# Patient Record
Sex: FEMALE | Race: WHITE | NOT HISPANIC OR LATINO | Employment: FULL TIME | URBAN - METROPOLITAN AREA
[De-identification: names, ages, dates, MRNs, and addresses within clinical notes are randomized per-mention and may not be internally consistent; named-entity substitution may affect disease eponyms.]

---

## 2018-03-21 ENCOUNTER — RECORDS - HEALTHEAST (OUTPATIENT)
Dept: LAB | Facility: CLINIC | Age: 58
End: 2018-03-21

## 2018-03-23 LAB — BACTERIA SPEC CULT: NO GROWTH

## 2018-11-14 ENCOUNTER — RECORDS - HEALTHEAST (OUTPATIENT)
Dept: LAB | Facility: CLINIC | Age: 58
End: 2018-11-14

## 2018-11-15 LAB
ALBUMIN SERPL-MCNC: 4 G/DL (ref 3.5–5)
ALP SERPL-CCNC: 115 U/L (ref 45–120)
ALT SERPL W P-5'-P-CCNC: 23 U/L (ref 0–45)
ANION GAP SERPL CALCULATED.3IONS-SCNC: 11 MMOL/L (ref 5–18)
AST SERPL W P-5'-P-CCNC: 26 U/L (ref 0–40)
BILIRUB SERPL-MCNC: 0.4 MG/DL (ref 0–1)
BUN SERPL-MCNC: 17 MG/DL (ref 8–22)
CALCIUM SERPL-MCNC: 9.6 MG/DL (ref 8.5–10.5)
CHLORIDE BLD-SCNC: 104 MMOL/L (ref 98–107)
CHOLEST SERPL-MCNC: 241 MG/DL
CK SERPL-CCNC: 427 U/L (ref 30–190)
CO2 SERPL-SCNC: 26 MMOL/L (ref 22–31)
CREAT SERPL-MCNC: 0.83 MG/DL (ref 0.6–1.1)
FASTING STATUS PATIENT QL REPORTED: NO
GFR SERPL CREATININE-BSD FRML MDRD: >60 ML/MIN/1.73M2
GLUCOSE BLD-MCNC: 83 MG/DL (ref 70–125)
HDLC SERPL-MCNC: 56 MG/DL
LDLC SERPL CALC-MCNC: 156 MG/DL
POTASSIUM BLD-SCNC: 3.9 MMOL/L (ref 3.5–5)
PROT SERPL-MCNC: 7.5 G/DL (ref 6–8)
SODIUM SERPL-SCNC: 141 MMOL/L (ref 136–145)
TRIGL SERPL-MCNC: 146 MG/DL
TSH SERPL DL<=0.005 MIU/L-ACNC: 2.03 UIU/ML (ref 0.3–5)

## 2018-11-16 LAB — 25(OH)D3 SERPL-MCNC: 47.3 NG/ML (ref 30–80)

## 2019-02-05 ENCOUNTER — RECORDS - HEALTHEAST (OUTPATIENT)
Dept: LAB | Facility: CLINIC | Age: 59
End: 2019-02-05

## 2019-02-06 LAB — TSH SERPL DL<=0.005 MIU/L-ACNC: 3.13 UIU/ML (ref 0.3–5)

## 2019-05-29 ENCOUNTER — RECORDS - HEALTHEAST (OUTPATIENT)
Dept: LAB | Facility: CLINIC | Age: 59
End: 2019-05-29

## 2019-05-29 LAB
ALBUMIN SERPL-MCNC: 3.6 G/DL (ref 3.5–5)
ALP SERPL-CCNC: 90 U/L (ref 45–120)
ALT SERPL W P-5'-P-CCNC: 22 U/L (ref 0–45)
ANION GAP SERPL CALCULATED.3IONS-SCNC: 10 MMOL/L (ref 5–18)
AST SERPL W P-5'-P-CCNC: 24 U/L (ref 0–40)
BILIRUB SERPL-MCNC: 0.2 MG/DL (ref 0–1)
BUN SERPL-MCNC: 20 MG/DL (ref 8–22)
CALCIUM SERPL-MCNC: 9.7 MG/DL (ref 8.5–10.5)
CHLORIDE BLD-SCNC: 107 MMOL/L (ref 98–107)
CHOLEST SERPL-MCNC: 236 MG/DL
CK SERPL-CCNC: 355 U/L (ref 30–190)
CO2 SERPL-SCNC: 24 MMOL/L (ref 22–31)
CREAT SERPL-MCNC: 0.82 MG/DL (ref 0.6–1.1)
FASTING STATUS PATIENT QL REPORTED: YES
GFR SERPL CREATININE-BSD FRML MDRD: >60 ML/MIN/1.73M2
GLUCOSE BLD-MCNC: 90 MG/DL (ref 70–125)
HDLC SERPL-MCNC: 51 MG/DL
LDLC SERPL CALC-MCNC: 152 MG/DL
POTASSIUM BLD-SCNC: 4.8 MMOL/L (ref 3.5–5)
PROT SERPL-MCNC: 6.7 G/DL (ref 6–8)
SODIUM SERPL-SCNC: 141 MMOL/L (ref 136–145)
TRIGL SERPL-MCNC: 167 MG/DL
TSH SERPL DL<=0.005 MIU/L-ACNC: 2.09 UIU/ML (ref 0.3–5)

## 2019-05-30 LAB — 25(OH)D3 SERPL-MCNC: 46.3 NG/ML (ref 30–80)

## 2019-07-17 ENCOUNTER — RECORDS - HEALTHEAST (OUTPATIENT)
Dept: ADMINISTRATIVE | Facility: OTHER | Age: 59
End: 2019-07-17

## 2019-11-07 ENCOUNTER — HOSPITAL ENCOUNTER (OUTPATIENT)
Dept: MAMMOGRAPHY | Facility: CLINIC | Age: 59
Discharge: HOME | End: 2019-11-07
Attending: FAMILY MEDICINE

## 2019-11-07 DIAGNOSIS — Z12.31 VISIT FOR SCREENING MAMMOGRAM: ICD-10-CM

## 2019-11-08 ENCOUNTER — RECORDS - HEALTHEAST (OUTPATIENT)
Dept: ADMINISTRATIVE | Facility: OTHER | Age: 59
End: 2019-11-08

## 2019-11-12 ENCOUNTER — RECORDS - HEALTHEAST (OUTPATIENT)
Dept: LAB | Facility: CLINIC | Age: 59
End: 2019-11-12

## 2019-11-12 LAB
ALBUMIN SERPL-MCNC: 4.1 G/DL (ref 3.5–5)
ALP SERPL-CCNC: 104 U/L (ref 45–120)
ALT SERPL W P-5'-P-CCNC: 33 U/L (ref 0–45)
ANION GAP SERPL CALCULATED.3IONS-SCNC: 11 MMOL/L (ref 5–18)
AST SERPL W P-5'-P-CCNC: 31 U/L (ref 0–40)
BILIRUB SERPL-MCNC: 0.2 MG/DL (ref 0–1)
BUN SERPL-MCNC: 17 MG/DL (ref 8–22)
CALCIUM SERPL-MCNC: 9.6 MG/DL (ref 8.5–10.5)
CHLORIDE BLD-SCNC: 105 MMOL/L (ref 98–107)
CHOLEST SERPL-MCNC: 234 MG/DL
CK SERPL-CCNC: 465 U/L (ref 30–190)
CO2 SERPL-SCNC: 25 MMOL/L (ref 22–31)
CREAT SERPL-MCNC: 0.82 MG/DL (ref 0.6–1.1)
FASTING STATUS PATIENT QL REPORTED: YES
GFR SERPL CREATININE-BSD FRML MDRD: >60 ML/MIN/1.73M2
GLUCOSE BLD-MCNC: 82 MG/DL (ref 70–125)
HDLC SERPL-MCNC: 55 MG/DL
LDLC SERPL CALC-MCNC: 145 MG/DL
POTASSIUM BLD-SCNC: 4.7 MMOL/L (ref 3.5–5)
PROT SERPL-MCNC: 7.4 G/DL (ref 6–8)
SODIUM SERPL-SCNC: 141 MMOL/L (ref 136–145)
TRIGL SERPL-MCNC: 170 MG/DL

## 2019-11-13 LAB — 25(OH)D3 SERPL-MCNC: 41.7 NG/ML (ref 30–80)

## 2019-11-15 ENCOUNTER — HOSPITAL ENCOUNTER (OUTPATIENT)
Dept: MAMMOGRAPHY | Facility: CLINIC | Age: 59
Discharge: HOME | End: 2019-11-15
Attending: FAMILY MEDICINE

## 2019-11-15 DIAGNOSIS — N64.89 BREAST ASYMMETRY: ICD-10-CM

## 2020-04-21 ENCOUNTER — RECORDS - HEALTHEAST (OUTPATIENT)
Dept: LAB | Facility: CLINIC | Age: 60
End: 2020-04-21

## 2020-04-21 LAB
ANION GAP SERPL CALCULATED.3IONS-SCNC: 8 MMOL/L (ref 5–18)
BUN SERPL-MCNC: 18 MG/DL (ref 8–22)
CALCIUM SERPL-MCNC: 9.6 MG/DL (ref 8.5–10.5)
CHLORIDE BLD-SCNC: 106 MMOL/L (ref 98–107)
CK SERPL-CCNC: 244 U/L (ref 30–190)
CO2 SERPL-SCNC: 28 MMOL/L (ref 22–31)
CREAT SERPL-MCNC: 0.79 MG/DL (ref 0.6–1.1)
GFR SERPL CREATININE-BSD FRML MDRD: >60 ML/MIN/1.73M2
GLUCOSE BLD-MCNC: 99 MG/DL (ref 70–125)
POTASSIUM BLD-SCNC: 4.8 MMOL/L (ref 3.5–5)
SODIUM SERPL-SCNC: 142 MMOL/L (ref 136–145)

## 2020-04-27 ENCOUNTER — ALLIED HEALTH/NURSE VISIT (OUTPATIENT)
Dept: PHARMACY | Facility: PHYSICIAN GROUP | Age: 60
End: 2020-04-27
Payer: COMMERCIAL

## 2020-04-27 DIAGNOSIS — S86.899S MEDIAL TIBIAL STRESS SYNDROME, UNSPECIFIED LATERALITY, SEQUELA: ICD-10-CM

## 2020-04-27 DIAGNOSIS — M76.61 ACHILLES TENDINITIS OF BOTH LOWER EXTREMITIES: ICD-10-CM

## 2020-04-27 DIAGNOSIS — R60.9 EDEMA, UNSPECIFIED TYPE: ICD-10-CM

## 2020-04-27 DIAGNOSIS — I10 BENIGN ESSENTIAL HYPERTENSION: Primary | ICD-10-CM

## 2020-04-27 DIAGNOSIS — K50.919 CROHN'S DISEASE WITH COMPLICATION, UNSPECIFIED GASTROINTESTINAL TRACT LOCATION (H): ICD-10-CM

## 2020-04-27 DIAGNOSIS — G47.00 INSOMNIA, UNSPECIFIED TYPE: ICD-10-CM

## 2020-04-27 DIAGNOSIS — M76.62 ACHILLES TENDINITIS OF BOTH LOWER EXTREMITIES: ICD-10-CM

## 2020-04-27 DIAGNOSIS — Z78.9 TAKES DIETARY SUPPLEMENTS: ICD-10-CM

## 2020-04-27 DIAGNOSIS — R25.2 NOCTURNAL MUSCLE CRAMP: ICD-10-CM

## 2020-04-27 DIAGNOSIS — G25.81 RESTLESS LEGS SYNDROME (RLS): ICD-10-CM

## 2020-04-27 PROCEDURE — 99607 MTMS BY PHARM ADDL 15 MIN: CPT | Performed by: PHARMACIST

## 2020-04-27 PROCEDURE — 99605 MTMS BY PHARM NP 15 MIN: CPT | Performed by: PHARMACIST

## 2020-04-27 RX ORDER — CYCLOBENZAPRINE HCL 10 MG
10 TABLET ORAL 3 TIMES DAILY PRN
COMMUNITY

## 2020-04-27 RX ORDER — PRAMIPEXOLE DIHYDROCHLORIDE 0.5 MG/1
1 TABLET ORAL 2 TIMES DAILY
COMMUNITY

## 2020-04-27 RX ORDER — VALSARTAN 40 MG/1
40 TABLET ORAL 2 TIMES DAILY
COMMUNITY
End: 2024-08-01 | Stop reason: DRUGHIGH

## 2020-04-27 RX ORDER — FLUORIDE TOOTHPASTE
TOOTHPASTE DENTAL 4 TIMES DAILY
COMMUNITY
End: 2024-08-01

## 2020-04-27 RX ORDER — SALSALATE 500 MG/1
1000 TABLET, FILM COATED ORAL 2 TIMES DAILY
COMMUNITY

## 2020-04-27 RX ORDER — TRAMADOL HYDROCHLORIDE 50 MG/1
50 TABLET ORAL EVERY 6 HOURS PRN
COMMUNITY
End: 2024-08-01

## 2020-04-27 RX ORDER — LOPERAMIDE HYDROCHLORIDE 2 MG/1
2 TABLET ORAL 4 TIMES DAILY PRN
COMMUNITY

## 2020-04-27 NOTE — PROGRESS NOTES
[facility] ENCOUNTER  SUBJECTIVE/OBJECTIVE:                           Chante Tracy is a 59 year old female called for an initial visit. She was referred to me from Dr. Ho.      Patient consented to a telehealth visit: yes  Telemedicine Visit Details  Type of service:  Telephone visit  Start Time: 9:30  End Time: 10:41  Originating Location (pt. Location): Home  Distant Location (provider location):  [facility]  Mode of Communication:  Telephone    Chief Complaint: Medication review, to discuss dosing of valsartan.  Patient is having difficulty with dosing valsartan.  At higher doses she felt her blood pressure was significantly lower and could not tolerate.  However when the dose was decreased now her blood pressure is running too high.    Allergies/ADRs:  icy hot patches - rashes, Dyazide - hives, Zocor - hives, Motrin - upset stomach, Valium - heart races, amoxicillin - hives, aspirin - nose bleeds, hydrochlorothiazide - nose bleeds, indapamide - hives, lisinopril - hives, albuterol, Celebrex - hives, vomiting   Tobacco: Never smoker  Alcohol: not currently using  Caffeine: 4 cups/day of coffee  Activity: Patient is on her feet all day at work.  She would really like to start walking more, and being more active so she can start losing weight  PMH: Reviewed in [facility]W    Medication Adherence/Access:   Now with the midday dose of valsartan patient is having a difficult time remembering the 20 mg dose some days.  Otherwise typically is not forgetting her medications.    Hypertension/Edema:   Current medications include Valsartan 40 mg AM/ 20 mg PM/ 40 mg HS. Patient does self-monitor BP. Home BP monitoring in range of 130-140's systolic PM, /91.  Today her BP was 126/86.  Doesn't recall diastolic BP.   Patient reports no current medication side effects.  Pt has stopped adding salt to food, reports she would like to lose weight.  She is trying to go on walks often to help with  weight loss.  She was thinking of buying a hula hoop, she used to do that when she was younger.   Pt reports some edema in ankles.  Elevates her legs at night, and has tried compression socks.  Is on her feet a lot at work, is a  at [employer].    Patient wanted to try splitting the dose to see if splitting the dose would help her absorption.  So far the PM dose she is having some difficulty with remembering the afternoon one.  Pt had been taken 160 mg at once and felt very dizzy with it.  She felt very dizzy when her BP was 112 systolic.  She reports syncopal episode last year.  Her BP 88/40 during this episode, When taking 160 mg dose.       Pt reports she has slowed emptying of her stomach, and that she will notices the stomach emptying at once and all the meds will hit her.  She reports will not have good digestion of her foods/meds.   Says the pills sit there for a long time, and then will get a weeks worth of medications all absorbed at once.    Insomnia:   Current medications include: amitriptyline 10 mg at bedtime. Pt reports trouble falling asleep, just can't stop thinking.  Denies grogginess, constipation.  Does have dry mouth, but uses biotine mouth wash PRN.    Muscle Cramps/RLS:  Currently taking Cyclobenzaprine 20 mg in the morning and 20 mg at bedtime, and Pramipexole 0.5 mg at bedtime.  She does find cyclobenzaprine helpful for preventing muscle cramps.  Denies sedation with this.  She pramipexole works very well, is able to sleep well within an hour of taking it.    Pt gets adalberto horses almost twice per week, usually at night.  Has been going on since January.      Shin splints/Achilles tendonitits:   Patient has tramadol 50 mg once daily PRN, but rarely uses it.  She prefers to use as little as possible.  She may also sometimes use Excedrine migraine for the tendon/chrons pain.    Chrons:   Patient currently taking Salsalate 1000 mg twice daily, and Loperamide 2-4 mg PRN.  She reports this is  very effective.  She is no longer having gas build up, stomach discomfort, and pain has improved a lot.  Still has flare ups of her Chrons, but usually mild to moderate.  She had 3 attacks so far this year.    Patient reports that she also has lazy gut syndrome, and this is part of what affects her stomach absorption of the valsartan medication.  Per patient she has also been tested for slowed gastric emptying and it was slightly slow.      Supplements/OTCs:   Patient is currently taking vitamin D 5000 units once daily.  Denies issues with this.      4/21/2020:   Vitals: Ht 64, Wt 220 lb 4 oz, BMI 37.80, Pulse 95, /80, Doctor:138/78, Arm / Cuff size Regular:lt, Initials tc.       Today's Vitals: There were no vitals taken for this visit. -Telephone encounter      ASSESSMENT:                              Medication Adherence: good, no issues identified      Hypertension/Edema:   Needs Improvement. Patient is not meeting BP goal of < 130/80mmHg.  Patient could try crushing up the valsartan tablets so that they will more easily absorbed when she takes them.   It is not likely that the valsartan tablets are sitting in her stomach for days at a time and then being absorbed.  It is possible though that with her lazy gut syndrome and Crohn's she may have altered absorption of these medications and it could be inconsistent.  This could possibly explain how she feels that the medication will hit her all at once or feels more dizziness from it at times.  Crushing it will likely help with this issue so that it will absorb more easily once in the stomach.    If this does not help we could look into possible compounded oral medications for blood pressure or topicals such as low-dose of a clonidine patch.  It would be preferred to keep her on a ARB however.    Insomnia:   Stable.    Muscle Cramps/RLS:  Needs improvement.  We discussed the importance of doing nightly stretches for her calves and thighs to help prevent the  muscle cramps from occurring.  It might also be beneficial to consider a trial on a supplements such as magnesium 200 to 400 mg once nightly for 1 to 2 months.  Could also consider a trial on vitamin B complex once daily at bedtime.  There is some small evidence and anecdotal evidence of trying the supplements to help with nocturnal cramps.  We discussed that the evidence is not robust but patient could try it if she would like and she would prefer natural options if able to.  If trial on above supplements is not effective, could discuss trying a low-dose of gabapentin 100 to 300 mg at bedtime as well with Dr. Ho.    Shin splints/Achilles tendonitits:   Stable.    Chrons:   Stable.  We discussed some of the risks of long-term use of loperamide.  It is possible that loperamide can sometimes cause issues such as lazy gut syndrome or slower movement of bowels.      Supplements/OTCs:   Stable.    PLAN:                            1.  Try crushing the valsartan tablets so that they are easier for your stomach to absorb.  We will continue having you take valsartan 40 mg in the morning, 20 mg in the afternoon, and 40 mg at bedtime.  Please try to take your blood pressure at different times of day, at least once daily over the next few weeks.      2.  Recommend starting a magnesium supplement 250-400 mg once daily at bedtime, for 1 to 2 months to see if it helps with your muscle cramps at night.  It is also very important to try doing a lot of stretching exercises for your legs as well to prevent these muscle cramps.    I spent 60 minutes with this patient today. I offer these suggestions for consideration by Dr. Ho. A copy of the visit note was provided to the patient's primary care and referring provider.    Will follow up in 1 month.    The patient was mailed a summary of these recommendations.     Swetha Donald, Suleiman  Medication Therapy Management Pharmacist  Pager: [phone]

## 2020-04-30 RX ORDER — AMITRIPTYLINE HYDROCHLORIDE 10 MG/1
10 TABLET ORAL AT BEDTIME
COMMUNITY

## 2020-04-30 NOTE — PATIENT INSTRUCTIONS
Recommendations from today's MTM visit:                                                    MTM (medication therapy management) is a service provided by a clinical pharmacist designed to help you get the most of out of your medicines.   Today we reviewed what your medicines are for, how to know if they are working, that your medicines are safe and how to make your medicine regimen as easy as possible.     1.  Try crushing the valsartan tablets so that they are easier for your stomach to absorb.  We will continue having you take valsartan 40 mg in the morning, 20 mg in the afternoon, and 40 mg at bedtime.  Please try to take your blood pressure at different times of day, at least once daily over the next few weeks.      2.  Recommend starting a magnesium supplement 250-400 mg once daily at bedtime, for 1 to 2 months to see if it helps with your muscle cramps at night.  It is also very important to try doing a lot of stretching exercises for your legs as well to prevent these muscle cramps.  It was great to speak with you today.  I value your experience and would be very thankful for your time with providing feedback on our clinic survey. You may receive a survey via email or text message in the next few days.     Next MTM visit: 6/1/2020 at 9:30 AM, telephone follow up    To schedule another MTM appointment, please call the clinic directly or you may call the MTM scheduling line at [phone] or toll-free at [phone].     My Clinical Pharmacist's contact information:                                                      It was a pleasure talking with you today!  Please feel free to contact me with any questions or concerns you have.      Swetha Donald, Suleiman  Medication Therapy Management Pharmacist  Pager: [phone]

## 2020-08-31 ENCOUNTER — ALLIED HEALTH/NURSE VISIT (OUTPATIENT)
Dept: PHARMACY | Facility: PHYSICIAN GROUP | Age: 60
End: 2020-08-31
Payer: COMMERCIAL

## 2020-08-31 DIAGNOSIS — R60.9 EDEMA, UNSPECIFIED TYPE: ICD-10-CM

## 2020-08-31 DIAGNOSIS — I10 BENIGN ESSENTIAL HYPERTENSION: Primary | ICD-10-CM

## 2020-08-31 PROCEDURE — 99607 MTMS BY PHARM ADDL 15 MIN: CPT | Performed by: PHARMACIST

## 2020-08-31 PROCEDURE — 99606 MTMS BY PHARM EST 15 MIN: CPT | Performed by: PHARMACIST

## 2020-08-31 NOTE — PROGRESS NOTES
"MTM ENCOUNTER  SUBJECTIVE/OBJECTIVE:                           Chante Tracy is a 59 year old female { :741098} for {mtmvisit:231229}         Patient consented to a telehealth visit: {YES(DEFAULT)/NO/MULT:795996::\"yes\"}  Telemedicine Visit Details  Type of service:  {telemedvisitmtm:939162::\"Telephone visit\"}  Start Time: {video/phone visit start time:152948}  End Time: {video/phone visit end time:152948}  Originating Location (pt. Location): Home  Distant Location (provider location):  [facility]  Mode of Communication:  {telemedmtm2:739389::\"Telephone\"}    Chief Complaint: ***.  Personal Healthcare Goals: ***    Allergies/ADRs: {1/2/3/4/5:964579}  Tobacco:  has no history on file for tobacco.{Tobacco Cessation needed for ACO -- Delete if patient is a non-smoker:895916}  Alcohol: {ALCOHOL CONSUMPTION HX:727791}  Caffeine: {CAFFEINE INTAKE:943915}  Activity: ***  PMH: {1/2/3/4/5:445666}    Medication Adherence/Access: {fumedadherence:226817}    ***: ***  ***: ***  ***: ***  ***: ***  ***: ***    Today's Vitals: There were no vitals taken for this visit.      ASSESSMENT:                          {mtmpartdquestion:789394}    Medication Adherence: {adherenceassess:181613}, {ADHERENCEOPTIONSASSES:512399}    ***: ***  ***: ***  ***: ***  ***: ***  ***: ***    PLAN:                          {Remind patient about MTM survey:143879}{ANGELA?:422487}  ***    I spent {mtm total time 3:717837} with this patient today{MTMpartdbillingquestion:123606}. { :510636}. A copy of the visit note was provided to the patient's {[facility] chart:770468} provider.    Will follow up in ***.    The patient {GIVEN/NOT GIVEN:098777::\"was given\"} a summary of these recommendations. {covisit:633446}    ***          "

## 2020-08-31 NOTE — PROGRESS NOTES
MTM ENCOUNTER  SUBJECTIVE/OBJECTIVE:                           Chante Tracy is a 59 year old female called for a follow-up visit. She was referred to me from Dr. Ho.  Today's visit is a follow-up MTM visit from 4/27.     Patient consented to a telehealth visit: yes  Telemedicine Visit Details  Type of service:  Telephone visit  Start Time: 4:30  End Time: 5:10 PM  Originating Location (pt. Location): Home  Distant Location (provider location):  [facility]  Mode of Communication:  Telephone    Chief Complaint: Follow up on hypertension.    Medication Adherence/Access: no issues reported    Now with the midday dose of valsartan patient is having a difficult time remembering the 20 mg dose some days.  Otherwise typically is not forgetting her medications.     Hypertension/Edema:   Current medications include Valsartan 40 three times daily Patient does self-monitor BP. Home BP monitoring in range of 145-155 systolic.  Had some that were lower: 116/77, 112/75, 128/83, 156/96 this morning.  Patient reports no current medication side effects.   Pt has stopped adding salt to food, reports she would like to lose weight.  She is trying to go on walks often to help with weight loss.  She was thinking of buying a hula hoop, she used to do that when she was younger.   Pt reports some edema in ankles and foot.  Elevates her legs at night, and has tried compression socks.  Is on her feet a lot at work, is a  at [employer].       8/3/2020:  Vitals: Ht 64, Wt 223 lb 6 oz, BMI 38.34, Pulse 90, /90, Doctor:152/80, Arm / Cuff size Regular:lt, Initials tc  pt monitor 160/103, then 155/94.       Today's Vitals: There were no vitals taken for this visit. - telephone encounter      ASSESSMENT:                              Medication Adherence: good, no issues identified      Hypertension/Edema:   Needs Improvement. Patient is not meeting BP goal of < 140/90mmHg.  Patient would like to  continue trying to lose weight before increasing her BP medications.  This is OK to try this until her next follow up with Dr. Ho.  Then would suggest increasing valsartan dose by 20 mg daily, or consider trial on Ethacrynic acid (loop diuretic).  It is not expected to have cross reactivity to allergies with other sulfa diuretics because it doesn't have a sulfa/sulfur group.    PLAN:                            Future consideration:  1.  If BP still elevated at next follow up could consider trying Ethacrynic acid 25 mg daily.    I spent 30 minutes with this patient today. I offer these suggestions for consideration by Dr. Ho. A copy of the visit note was provided to the patient's primary care provider.    Will follow up in 3 months.    The patient declined a summary of these recommendations.     Xavier WeberD  Medication Therapy Management Pharmacist  Pager: [phone]

## 2020-11-03 ENCOUNTER — RECORDS - HEALTHEAST (OUTPATIENT)
Dept: LAB | Facility: CLINIC | Age: 60
End: 2020-11-03

## 2020-11-03 LAB
ANION GAP SERPL CALCULATED.3IONS-SCNC: 9 MMOL/L (ref 5–18)
BUN SERPL-MCNC: 21 MG/DL (ref 8–22)
CALCIUM SERPL-MCNC: 9.2 MG/DL (ref 8.5–10.5)
CHLORIDE BLD-SCNC: 106 MMOL/L (ref 98–107)
CO2 SERPL-SCNC: 25 MMOL/L (ref 22–31)
CREAT SERPL-MCNC: 0.89 MG/DL (ref 0.6–1.1)
GFR SERPL CREATININE-BSD FRML MDRD: >60 ML/MIN/1.73M2
GLUCOSE BLD-MCNC: 96 MG/DL (ref 70–125)
POTASSIUM BLD-SCNC: 4.6 MMOL/L (ref 3.5–5)
SODIUM SERPL-SCNC: 140 MMOL/L (ref 136–145)
TSH SERPL DL<=0.005 MIU/L-ACNC: 1.98 UIU/ML (ref 0.3–5)

## 2020-12-18 ENCOUNTER — VIRTUAL VISIT (OUTPATIENT)
Dept: PHARMACY | Facility: PHYSICIAN GROUP | Age: 60
End: 2020-12-18
Payer: COMMERCIAL

## 2020-12-18 DIAGNOSIS — I10 BENIGN ESSENTIAL HYPERTENSION: Primary | ICD-10-CM

## 2020-12-18 DIAGNOSIS — R60.9 EDEMA, UNSPECIFIED TYPE: ICD-10-CM

## 2020-12-18 PROCEDURE — 99606 MTMS BY PHARM EST 15 MIN: CPT | Mod: TEL | Performed by: PHARMACIST

## 2020-12-18 PROCEDURE — 99607 MTMS BY PHARM ADDL 15 MIN: CPT | Mod: TEL | Performed by: PHARMACIST

## 2020-12-18 RX ORDER — ETHACRYNIC ACID 25 MG/1
25 TABLET ORAL DAILY
COMMUNITY
End: 2020-12-31

## 2020-12-18 NOTE — PROGRESS NOTES
MTM ENCOUNTER  SUBJECTIVE/OBJECTIVE:                           Chante Tracy is a 60 year old female called for a follow-up visit. She was referred to me from Dr. Ho.  Today's visit is a follow-up MTM visit from 8/31/2020     Reason for visit: Follow up on edema.  Patient had adverse effect from ethacrynic acid.    Allergies/ADRs:         Tobacco: She reports that she has never smoked. She does not have any smokeless tobacco history on file.  Alcohol: not currently using  Caffeine: no caffeine  Activity: little right now due to edema  Past Medical History: Reviewed in chart    Medication Adherence/Access: no issues reported       Hypertension/Edema:   Current medications include Valsartan 40 three times daily, and Ethacrynic acid 12.5-25 mg daily PRN for edema.  Patient does self-monitor BP. Home BP monitoring in range of 145-155 systolic.  Had some that were lower: 116/77, 112/75, 128/83, 156/96 this morning.  Patient reports significant sedation with ethacrynic acid, was sleeping most of the day after doses (21 out of 24 hours).  Her edema has been a lot worse the last few weeks, and legs very tight/painful.  I consulted with Dr. Ho, and we were going to have her try Furosemide liquid to allow for starting at a very low dose (5 mg).  Pt is very sensitive to medications and side effects.  We would like to be able to fine tune the furosemide dose.  Pt has stopped adding salt to food, reports she would like to lose weight. She is trying to go on walks often to help with weight loss.  She was thinking of buying a [employer] hoop, she used to do that when she was younger.   Is on her feet a lot at work, is a  at "[employer] an [employer]".       Today's Vitals: There were no vitals taken for this visit. - telephone encounter.      ASSESSMENT:                              Medication Adherence: No issues identified    Hypertension/Edema:   Patient is not meeting blood pressure goal of < 140/90mmHg. Patient having a lot of  edema and didn't toelrate ethacrynic acid.  Patient would benefit from the following changes - stopping ethacrynic acid and starting furosemide 5 mg daily.  Discussed how to gradually titrate the furosemide by 5 mg every 3-4 days as needed up to 10 mg twice daily.  Need to clarify the rx for furosemide to the pharmacy.  Wrote the wrong daily maximum of 10 mg on rx, but should be 20 mg per day max.    PLAN:                            1. Recommend stopping ethacrynic acid, and starting Furosemide 5-10 mg twice daily as needed for edema.  Resent Rx with clarified prescription.    I spent 30 minutes with this patient today. All changes were made via verbal approval with Dr. Ho. A copy of the visit note was provided to the patient's primary care provider.    Will follow up in 2 weeks.    The patient declined a summary of these recommendations.     Swetha Donald, PharmD  Medication Therapy Management Pharmacist  Pager: [phone]      Patient consented to a telehealth visit: yes  Telemedicine Visit Details  Type of service:  Telephone visit  Start Time: 3:31 PM  End Time: 3:55 PM  Originating Location (patient location): Home  Distant Location (provider location):  [facility]  Mode of Communication:  Telephone

## 2020-12-18 NOTE — PROGRESS NOTES
"MTM ENCOUNTER  SUBJECTIVE/OBJECTIVE:                           Chante Tracy is a 60 year old female called for a follow-up visit. She was referred to me from ***. {mtisitdetails:844014} Today's visit is a follow-up MTM visit from ***     Reason for visit: ***.    Allergies/ADRs: {1/2/3/4/5:366540}  Tobacco: She has no history on file for tobacco.  Alcohol: {ALCOHOL CONSUMPTION HX:449079}  Caffeine: {CAFFEINE INTAKE:405036}  Activity: ***  Past Medical History: {1/2/3/4/5:325205}  {Social and Goals:023368}    Medication Adherence/Access: {fumedadherence:800660}    ***: ***  ***: ***  ***: ***  ***: ***  ***: ***    Today's Vitals: There were no vitals taken for this visit.      ASSESSMENT:                          {mtmpartdquestion:280329}    Medication Adherence: {adherencechoices:874125}    ***: ***  ***: ***  ***: ***  ***: ***  ***: ***    PLAN:                          {Remind patient about MTM survey:131313}{ANGELA?:605053}  ***    I spent {mtm total time 3:390847} with this patient today{MTMpartdbillingquestion:510668}. { :897537}. A copy of the visit note was provided to the patient's {[facility] chart:170026} provider.    Will follow up in ***.    The patient {GIVEN/NOT GIVEN:079089::\"was given\"} a summary of these recommendations. {covisit:729532}    ***    Patient consented to a telehealth visit: {YES(DEFAULT)/NO/MULT:972289::\"yes\"}  Telemedicine Visit Details  Type of service:  {telemedvisitmtm:065736::\"Telephone visit\"}  Start Time: {video/phone visit start time:152948}  End Time: {video/phone visit end time:152948}  Originating Location (patient location): Home  Distant Location (provider location):  [facility]  Mode of Communication:  {[facility]:278898::\"Telephone\"}    {Click at end of visit to add-in MTP:202630}      "

## 2020-12-30 NOTE — PROGRESS NOTES
MTM ENCOUNTER  SUBJECTIVE/OBJECTIVE:                           Chante Tracy is a 60 year old female called for a follow-up visit. She was referred to me from .  Today's visit is a follow-up MTM visit from 12/18.     Reason for visit: Follow up on new start furosemide and edema.       Allergies/ADRs:        Tobacco: She reports that she has never smoked. She does not have any smokeless tobacco history on file.  Alcohol: not currently using  Caffeine: no caffeine  Activity: little right now due to edema  Past Medical History: Reviewed in chart     Medication Adherence/Access: no issues reported     Ankle pain:  Having trouble with her feet.  Her achilles tendon is causing a lot of pain right now, and it feels like it's coming apart again.  Has a bone spur on her left foot, and it feels like it's coming out.  She is going to schedule an appointment with her podiatrist.  She doesn't think she can take time off for a surgery right now though, and is hoping she can do some things at home to manage it.     Hypertension/Edema:   Current medications include Valsartan 40 mg twice daily, and Furosemide 5 mg (0.5 ml) daily PRN for edema.  Patient does self-monitor BP. Home BP monitoring in range of 145-155 systolic.   She is no longer taking valsartan three times daily, was getting lightheaded with the midday.  The edema is improving a lot, but still has it.    BP: 122-136 mmHg's for systolic BP.    Patient is noticing sedation after taking furosemide.  Often will take a nap for awhile after her dose, and it sometimes getting dizzy.  She is typically taking it at night or only on the days she doesn't work.  She has also been working very long hours at work, and thinks her body is just exhausted.  So unclear if it's primarily a side effect from furosemide, or just that she's very run down/tired.  Possibly both.  Pt has stopped adding salt to food, reports she would like to lose weight. She is trying to go on walks  often to help with weight loss.  She was thinking of buying a hula hoop, she used to do that when she was younger.   Is on her feet a lot at work, is a  at [employer].     219 lb yesterday, has lost 3 lbs so far since starting furosemide      Today's Vitals: There were no vitals taken for this visit.  - telephone encounter.    ASSESSMENT:                              Medication Adherence: No issues identified    Ankle Pain:  Plan in place, needs further assessment by podiatrist.    Hypertension/Edema:   Patient is not meeting blood pressure goal of < 140/90mmHg. Patient noticing improvement in edema with Furosemide, but also having some grogginess with his.  It's also possible from working such long hours at work and being exhausted.  Discussed that she could try increasing the furosemide to 0.5 ml twice daily on her days she doesn't work    PLAN:                            1.  Can try taking furosemide 0.5 ml (5 mg) twice daily as needed.  Discussed titrating dose by 0.5 ml every few days as needed and tolerated for edema.    I spent 30 minutes with this patient today. I offer these suggestions for consideration by Dr. Ho. A copy of the visit note was provided to the patient's primary care provider.    Will follow up as needed.    The patient declined a summary of these recommendations.     Swetha Donald, PharmD  Medication Therapy Management Pharmacist  Pager: [phone]      Patient consented to a telehealth visit: yes  Telemedicine Visit Details  Type of service:  Telephone visit  Start Time: 11:15 AM  End Time: 11:34 AM  Originating Location (patient location): [place]  Distant Location (provider location):  [facility]  Mode of Communication:  Telephone

## 2020-12-31 ENCOUNTER — ALLIED HEALTH/NURSE VISIT (OUTPATIENT)
Dept: PHARMACY | Facility: PHYSICIAN GROUP | Age: 60
End: 2020-12-31

## 2020-12-31 DIAGNOSIS — I10 BENIGN ESSENTIAL HYPERTENSION: Primary | ICD-10-CM

## 2020-12-31 DIAGNOSIS — M25.572 PAIN IN JOINT INVOLVING ANKLE AND FOOT, LEFT: ICD-10-CM

## 2020-12-31 DIAGNOSIS — R60.9 EDEMA, UNSPECIFIED TYPE: ICD-10-CM

## 2020-12-31 PROCEDURE — 99606 MTMS BY PHARM EST 15 MIN: CPT | Performed by: PHARMACIST

## 2020-12-31 PROCEDURE — 99607 MTMS BY PHARM ADDL 15 MIN: CPT | Performed by: PHARMACIST

## 2020-12-31 RX ORDER — FUROSEMIDE 10 MG/ML
5-20 SOLUTION ORAL 2 TIMES DAILY PRN
COMMUNITY
End: 2024-08-01

## 2021-05-24 ENCOUNTER — RECORDS - HEALTHEAST (OUTPATIENT)
Dept: ADMINISTRATIVE | Facility: CLINIC | Age: 61
End: 2021-05-24

## 2021-05-26 ENCOUNTER — RECORDS - HEALTHEAST (OUTPATIENT)
Dept: ADMINISTRATIVE | Facility: CLINIC | Age: 61
End: 2021-05-26

## 2021-05-30 ENCOUNTER — RECORDS - HEALTHEAST (OUTPATIENT)
Dept: ADMINISTRATIVE | Facility: CLINIC | Age: 61
End: 2021-05-30

## 2021-06-01 ENCOUNTER — RECORDS - HEALTHEAST (OUTPATIENT)
Dept: ADMINISTRATIVE | Facility: CLINIC | Age: 61
End: 2021-06-01

## 2021-07-13 ENCOUNTER — RECORDS - HEALTHEAST (OUTPATIENT)
Dept: ADMINISTRATIVE | Facility: CLINIC | Age: 61
End: 2021-07-13

## 2021-07-21 ENCOUNTER — RECORDS - HEALTHEAST (OUTPATIENT)
Dept: ADMINISTRATIVE | Facility: CLINIC | Age: 61
End: 2021-07-21

## 2021-08-30 ENCOUNTER — LAB REQUISITION (OUTPATIENT)
Dept: LAB | Facility: CLINIC | Age: 61
End: 2021-08-30
Payer: COMMERCIAL

## 2021-08-30 DIAGNOSIS — I10 ESSENTIAL (PRIMARY) HYPERTENSION: ICD-10-CM

## 2021-08-30 DIAGNOSIS — R05.9 COUGH: ICD-10-CM

## 2021-08-30 LAB
ANION GAP SERPL CALCULATED.3IONS-SCNC: 9 MMOL/L (ref 5–18)
BUN SERPL-MCNC: 19 MG/DL (ref 8–22)
CALCIUM SERPL-MCNC: 10.2 MG/DL (ref 8.5–10.5)
CHLORIDE BLD-SCNC: 103 MMOL/L (ref 98–107)
CO2 SERPL-SCNC: 28 MMOL/L (ref 22–31)
CREAT SERPL-MCNC: 0.86 MG/DL (ref 0.6–1.1)
GFR SERPL CREATININE-BSD FRML MDRD: 75 ML/MIN/1.73M2
GLUCOSE BLD-MCNC: 93 MG/DL (ref 70–125)
POTASSIUM BLD-SCNC: 4.6 MMOL/L (ref 3.5–5)
SARS-COV-2 RNA RESP QL NAA+PROBE: NEGATIVE
SODIUM SERPL-SCNC: 140 MMOL/L (ref 136–145)

## 2021-08-30 PROCEDURE — 80048 BASIC METABOLIC PNL TOTAL CA: CPT | Mod: ORL | Performed by: FAMILY MEDICINE

## 2021-08-30 PROCEDURE — U0003 INFECTIOUS AGENT DETECTION BY NUCLEIC ACID (DNA OR RNA); SEVERE ACUTE RESPIRATORY SYNDROME CORONAVIRUS 2 (SARS-COV-2) (CORONAVIRUS DISEASE [COVID-19]), AMPLIFIED PROBE TECHNIQUE, MAKING USE OF HIGH THROUGHPUT TECHNOLOGIES AS DESCRIBED BY CMS-2020-01-R: HCPCS | Mod: ORL | Performed by: FAMILY MEDICINE

## 2021-09-08 ENCOUNTER — VIRTUAL VISIT (OUTPATIENT)
Dept: PHARMACY | Facility: PHYSICIAN GROUP | Age: 61
End: 2021-09-08

## 2021-09-08 DIAGNOSIS — Z28.21 2019-NCOV VACCINATION DECLINED: Primary | ICD-10-CM

## 2021-09-08 PROCEDURE — 99207 PR NO CHARGE LOS: CPT | Performed by: PHARMACIST

## 2021-09-08 NOTE — PROGRESS NOTES
Clinical Pharmacy Consult:                                                    Chante Tracy is a 60 year old female called for a clinical pharmacist consult.  She was referred to me from Dr. Ho.     Reason for Consult: Referral to discuss the COVID vaccines    Discussion:   Covid Vaccines:  She is worried about getting the COVID vaccine due to the risk of side effects.  She had side effects with the flu shot.  She would be a candidate for one.  Discussed common side effects of feeling run down, tired, muscle aches, headaches.  She would like to wait until she feels better and then would consider.    Plan:  1. Recommend getting the COVID vaccine when feeling better.  She would be a candidate.  Education provided on side effects with the vaccine  2.  Patient would like help getting referral to [facility] in [place] to set up care with new neurologist.

## 2022-01-17 ENCOUNTER — LAB REQUISITION (OUTPATIENT)
Dept: LAB | Facility: CLINIC | Age: 62
End: 2022-01-17
Payer: COMMERCIAL

## 2022-01-17 DIAGNOSIS — Z00.00 ENCOUNTER FOR GENERAL ADULT MEDICAL EXAMINATION WITHOUT ABNORMAL FINDINGS: ICD-10-CM

## 2022-01-17 PROCEDURE — 87624 HPV HI-RISK TYP POOLED RSLT: CPT | Mod: ORL | Performed by: FAMILY MEDICINE

## 2022-01-17 PROCEDURE — G0123 SCREEN CERV/VAG THIN LAYER: HCPCS | Mod: ORL | Performed by: FAMILY MEDICINE

## 2022-01-19 LAB
HUMAN PAPILLOMA VIRUS 16 DNA: NEGATIVE
HUMAN PAPILLOMA VIRUS 18 DNA: NEGATIVE
HUMAN PAPILLOMA VIRUS FINAL DIAGNOSIS: NORMAL
HUMAN PAPILLOMA VIRUS OTHER HR: NEGATIVE

## 2022-01-25 LAB
BKR LAB AP GYN ADEQUACY: NORMAL
BKR LAB AP GYN INTERPRETATION: NORMAL
BKR LAB AP HPV REFLEX: NORMAL
BKR LAB AP LMP: NORMAL
BKR LAB AP PREVIOUS ABNORMAL: NORMAL
PATH REPORT.COMMENTS IMP SPEC: NORMAL
PATH REPORT.COMMENTS IMP SPEC: NORMAL
PATH REPORT.RELEVANT HX SPEC: NORMAL

## 2022-04-05 ENCOUNTER — LAB REQUISITION (OUTPATIENT)
Dept: LAB | Facility: CLINIC | Age: 62
End: 2022-04-05
Payer: COMMERCIAL

## 2022-04-05 DIAGNOSIS — J02.9 ACUTE PHARYNGITIS, UNSPECIFIED: ICD-10-CM

## 2022-04-05 PROCEDURE — 87081 CULTURE SCREEN ONLY: CPT | Mod: ORL | Performed by: FAMILY MEDICINE

## 2022-04-08 LAB — BACTERIA SPEC CULT: NORMAL

## 2022-11-30 ENCOUNTER — LAB REQUISITION (OUTPATIENT)
Dept: LAB | Facility: CLINIC | Age: 62
End: 2022-11-30
Payer: COMMERCIAL

## 2022-11-30 DIAGNOSIS — E78.2 MIXED HYPERLIPIDEMIA: ICD-10-CM

## 2022-11-30 DIAGNOSIS — I10 ESSENTIAL (PRIMARY) HYPERTENSION: ICD-10-CM

## 2022-11-30 DIAGNOSIS — E55.9 VITAMIN D DEFICIENCY, UNSPECIFIED: ICD-10-CM

## 2022-11-30 DIAGNOSIS — G25.81 RESTLESS LEGS SYNDROME: ICD-10-CM

## 2022-11-30 LAB
ALBUMIN SERPL BCG-MCNC: 4 G/DL (ref 3.5–5.2)
ALP SERPL-CCNC: 101 U/L (ref 35–104)
ALT SERPL W P-5'-P-CCNC: 32 U/L (ref 10–35)
ANION GAP SERPL CALCULATED.3IONS-SCNC: 10 MMOL/L (ref 7–15)
AST SERPL W P-5'-P-CCNC: 25 U/L (ref 10–35)
BILIRUB SERPL-MCNC: 0.2 MG/DL
BUN SERPL-MCNC: 18.9 MG/DL (ref 8–23)
CALCIUM SERPL-MCNC: 9.1 MG/DL (ref 8.8–10.2)
CHLORIDE SERPL-SCNC: 102 MMOL/L (ref 98–107)
CHOLEST SERPL-MCNC: 247 MG/DL
CK SERPL-CCNC: 175 U/L (ref 26–192)
CREAT SERPL-MCNC: 0.79 MG/DL (ref 0.51–0.95)
DEPRECATED CALCIDIOL+CALCIFEROL SERPL-MC: 38 UG/L (ref 20–75)
DEPRECATED HCO3 PLAS-SCNC: 26 MMOL/L (ref 22–29)
ERYTHROCYTE [DISTWIDTH] IN BLOOD BY AUTOMATED COUNT: 13.4 % (ref 10–15)
GFR SERPL CREATININE-BSD FRML MDRD: 85 ML/MIN/1.73M2
GLUCOSE SERPL-MCNC: 91 MG/DL (ref 70–99)
HCT VFR BLD AUTO: 33.9 % (ref 35–47)
HDLC SERPL-MCNC: 47 MG/DL
HGB BLD-MCNC: 12.3 G/DL (ref 11.7–15.7)
LDLC SERPL CALC-MCNC: 158 MG/DL
MCH RBC QN AUTO: 33.4 PG (ref 26.5–33)
MCHC RBC AUTO-ENTMCNC: 36.3 G/DL (ref 31.5–36.5)
MCV RBC AUTO: 92 FL (ref 78–100)
NONHDLC SERPL-MCNC: 200 MG/DL
PLATELET # BLD AUTO: 207 10E3/UL (ref 150–450)
POTASSIUM SERPL-SCNC: 4.6 MMOL/L (ref 3.4–5.3)
PROT SERPL-MCNC: 6.9 G/DL (ref 6.4–8.3)
RBC # BLD AUTO: 3.68 10E6/UL (ref 3.8–5.2)
SODIUM SERPL-SCNC: 138 MMOL/L (ref 136–145)
TRIGL SERPL-MCNC: 208 MG/DL
TSH SERPL DL<=0.005 MIU/L-ACNC: 1.73 UIU/ML (ref 0.3–4.2)
WBC # BLD AUTO: 4.7 10E3/UL (ref 4–11)

## 2022-11-30 PROCEDURE — 83550 IRON BINDING TEST: CPT | Mod: ORL | Performed by: PHYSICIAN ASSISTANT

## 2022-11-30 PROCEDURE — 82306 VITAMIN D 25 HYDROXY: CPT | Mod: ORL | Performed by: PHYSICIAN ASSISTANT

## 2022-11-30 PROCEDURE — 82550 ASSAY OF CK (CPK): CPT | Mod: ORL | Performed by: PHYSICIAN ASSISTANT

## 2022-11-30 PROCEDURE — 80061 LIPID PANEL: CPT | Mod: ORL | Performed by: PHYSICIAN ASSISTANT

## 2022-11-30 PROCEDURE — 85027 COMPLETE CBC AUTOMATED: CPT | Mod: ORL | Performed by: PHYSICIAN ASSISTANT

## 2022-11-30 PROCEDURE — 84443 ASSAY THYROID STIM HORMONE: CPT | Mod: ORL | Performed by: PHYSICIAN ASSISTANT

## 2022-11-30 PROCEDURE — 80053 COMPREHEN METABOLIC PANEL: CPT | Mod: ORL | Performed by: PHYSICIAN ASSISTANT

## 2022-12-01 LAB
IRON BINDING CAPACITY (ROCHE): 346 UG/DL (ref 240–430)
IRON SATN MFR SERPL: 18 % (ref 15–46)
IRON SERPL-MCNC: 61 UG/DL (ref 37–145)

## 2023-04-10 ENCOUNTER — LAB REQUISITION (OUTPATIENT)
Dept: LAB | Facility: CLINIC | Age: 63
End: 2023-04-10
Payer: COMMERCIAL

## 2023-04-10 DIAGNOSIS — Z01.818 ENCOUNTER FOR OTHER PREPROCEDURAL EXAMINATION: ICD-10-CM

## 2023-04-10 LAB
ANION GAP SERPL CALCULATED.3IONS-SCNC: 13 MMOL/L (ref 7–15)
BUN SERPL-MCNC: 17.7 MG/DL (ref 8–23)
CALCIUM SERPL-MCNC: 9.3 MG/DL (ref 8.8–10.2)
CHLORIDE SERPL-SCNC: 103 MMOL/L (ref 98–107)
CREAT SERPL-MCNC: 0.86 MG/DL (ref 0.51–0.95)
DEPRECATED HCO3 PLAS-SCNC: 24 MMOL/L (ref 22–29)
GFR SERPL CREATININE-BSD FRML MDRD: 77 ML/MIN/1.73M2
GLUCOSE SERPL-MCNC: 86 MG/DL (ref 70–99)
POTASSIUM SERPL-SCNC: 4.3 MMOL/L (ref 3.4–5.3)
SODIUM SERPL-SCNC: 140 MMOL/L (ref 136–145)

## 2023-04-10 PROCEDURE — 80048 BASIC METABOLIC PNL TOTAL CA: CPT | Mod: ORL | Performed by: STUDENT IN AN ORGANIZED HEALTH CARE EDUCATION/TRAINING PROGRAM

## 2023-04-21 ENCOUNTER — TRANSFERRED RECORDS (OUTPATIENT)
Dept: HEALTH INFORMATION MANAGEMENT | Facility: CLINIC | Age: 63
End: 2023-04-21

## 2024-05-06 ENCOUNTER — TRANSFERRED RECORDS (OUTPATIENT)
Dept: HEALTH INFORMATION MANAGEMENT | Facility: CLINIC | Age: 64
End: 2024-05-06

## 2024-05-06 ENCOUNTER — LAB REQUISITION (OUTPATIENT)
Dept: LAB | Facility: CLINIC | Age: 64
End: 2024-05-06
Payer: COMMERCIAL

## 2024-05-06 ENCOUNTER — MEDICAL CORRESPONDENCE (OUTPATIENT)
Dept: HEALTH INFORMATION MANAGEMENT | Facility: CLINIC | Age: 64
End: 2024-05-06

## 2024-05-06 DIAGNOSIS — E55.9 VITAMIN D DEFICIENCY, UNSPECIFIED: ICD-10-CM

## 2024-05-06 DIAGNOSIS — I10 ESSENTIAL (PRIMARY) HYPERTENSION: ICD-10-CM

## 2024-05-06 LAB
ANION GAP SERPL CALCULATED.3IONS-SCNC: 12 MMOL/L (ref 7–15)
BUN SERPL-MCNC: 20.5 MG/DL (ref 8–23)
CALCIUM SERPL-MCNC: 8.5 MG/DL (ref 8.8–10.2)
CHLORIDE SERPL-SCNC: 103 MMOL/L (ref 98–107)
CREAT SERPL-MCNC: 0.81 MG/DL (ref 0.51–0.95)
DEPRECATED HCO3 PLAS-SCNC: 25 MMOL/L (ref 22–29)
EGFRCR SERPLBLD CKD-EPI 2021: 82 ML/MIN/1.73M2
GLUCOSE SERPL-MCNC: 97 MG/DL (ref 70–99)
POTASSIUM SERPL-SCNC: 4.6 MMOL/L (ref 3.4–5.3)
SODIUM SERPL-SCNC: 140 MMOL/L (ref 135–145)
VIT D+METAB SERPL-MCNC: 41 NG/ML (ref 20–50)

## 2024-05-06 PROCEDURE — 80048 BASIC METABOLIC PNL TOTAL CA: CPT | Mod: ORL | Performed by: FAMILY MEDICINE

## 2024-05-06 PROCEDURE — 82306 VITAMIN D 25 HYDROXY: CPT | Mod: ORL | Performed by: FAMILY MEDICINE

## 2024-05-07 ENCOUNTER — TRANSCRIBE ORDERS (OUTPATIENT)
Dept: OTHER | Age: 64
End: 2024-05-07

## 2024-05-07 DIAGNOSIS — R20.8 BURNING SENSATION OF FEET: Primary | ICD-10-CM

## 2024-06-17 ENCOUNTER — LAB REQUISITION (OUTPATIENT)
Dept: LAB | Facility: CLINIC | Age: 64
End: 2024-06-17
Payer: COMMERCIAL

## 2024-06-17 DIAGNOSIS — I10 ESSENTIAL (PRIMARY) HYPERTENSION: ICD-10-CM

## 2024-06-17 PROCEDURE — 80048 BASIC METABOLIC PNL TOTAL CA: CPT | Mod: ORL | Performed by: FAMILY MEDICINE

## 2024-06-18 LAB
ANION GAP SERPL CALCULATED.3IONS-SCNC: 12 MMOL/L (ref 7–15)
BUN SERPL-MCNC: 22.6 MG/DL (ref 8–23)
CALCIUM SERPL-MCNC: 8.6 MG/DL (ref 8.8–10.2)
CHLORIDE SERPL-SCNC: 102 MMOL/L (ref 98–107)
CREAT SERPL-MCNC: 0.85 MG/DL (ref 0.51–0.95)
DEPRECATED HCO3 PLAS-SCNC: 25 MMOL/L (ref 22–29)
EGFRCR SERPLBLD CKD-EPI 2021: 78 ML/MIN/1.73M2
GLUCOSE SERPL-MCNC: 97 MG/DL (ref 70–99)
POTASSIUM SERPL-SCNC: 4.7 MMOL/L (ref 3.4–5.3)
SODIUM SERPL-SCNC: 139 MMOL/L (ref 135–145)

## 2024-06-26 ENCOUNTER — LAB (OUTPATIENT)
Dept: LAB | Facility: HOSPITAL | Age: 64
End: 2024-06-26
Payer: COMMERCIAL

## 2024-06-26 ENCOUNTER — OFFICE VISIT (OUTPATIENT)
Dept: NEUROLOGY | Facility: CLINIC | Age: 64
End: 2024-06-26
Attending: FAMILY MEDICINE
Payer: COMMERCIAL

## 2024-06-26 VITALS
DIASTOLIC BLOOD PRESSURE: 91 MMHG | RESPIRATION RATE: 20 BRPM | WEIGHT: 240 LBS | HEART RATE: 94 BPM | SYSTOLIC BLOOD PRESSURE: 156 MMHG

## 2024-06-26 DIAGNOSIS — M62.838 MUSCLE SPASM: ICD-10-CM

## 2024-06-26 DIAGNOSIS — R20.8 BURNING SENSATION OF FEET: ICD-10-CM

## 2024-06-26 DIAGNOSIS — R21 RASH: Primary | ICD-10-CM

## 2024-06-26 LAB
CK SERPL-CCNC: 546 U/L (ref 26–192)
HBA1C MFR BLD: 5.4 %
TOTAL PROTEIN SERUM FOR ELP: 7.2 G/DL (ref 6.4–8.3)
URATE SERPL-MCNC: 4.3 MG/DL (ref 2.4–5.7)
VIT B12 SERPL-MCNC: 569 PG/ML (ref 232–1245)

## 2024-06-26 PROCEDURE — 84425 ASSAY OF VITAMIN B-1: CPT

## 2024-06-26 PROCEDURE — 84155 ASSAY OF PROTEIN SERUM: CPT

## 2024-06-26 PROCEDURE — 84207 ASSAY OF VITAMIN B-6: CPT

## 2024-06-26 PROCEDURE — 86038 ANTINUCLEAR ANTIBODIES: CPT

## 2024-06-26 PROCEDURE — 84165 PROTEIN E-PHORESIS SERUM: CPT | Mod: 26 | Performed by: PATHOLOGY

## 2024-06-26 PROCEDURE — 86334 IMMUNOFIX E-PHORESIS SERUM: CPT | Performed by: PATHOLOGY

## 2024-06-26 PROCEDURE — 86617 LYME DISEASE ANTIBODY: CPT

## 2024-06-26 PROCEDURE — 84165 PROTEIN E-PHORESIS SERUM: CPT | Mod: TC | Performed by: PATHOLOGY

## 2024-06-26 PROCEDURE — 82607 VITAMIN B-12: CPT

## 2024-06-26 PROCEDURE — 82550 ASSAY OF CK (CPK): CPT

## 2024-06-26 PROCEDURE — 99205 OFFICE O/P NEW HI 60 MIN: CPT | Performed by: PSYCHIATRY & NEUROLOGY

## 2024-06-26 PROCEDURE — 36415 COLL VENOUS BLD VENIPUNCTURE: CPT

## 2024-06-26 PROCEDURE — 84550 ASSAY OF BLOOD/URIC ACID: CPT

## 2024-06-26 PROCEDURE — 83036 HEMOGLOBIN GLYCOSYLATED A1C: CPT

## 2024-06-26 PROCEDURE — 86334 IMMUNOFIX E-PHORESIS SERUM: CPT | Mod: 26 | Performed by: PATHOLOGY

## 2024-06-26 PROCEDURE — 82525 ASSAY OF COPPER: CPT

## 2024-06-26 PROCEDURE — 86618 LYME DISEASE ANTIBODY: CPT

## 2024-06-26 RX ORDER — VALSARTAN 160 MG/1
1 TABLET ORAL
COMMUNITY
Start: 2024-05-08

## 2024-06-26 RX ORDER — LEVOTHYROXINE SODIUM 25 UG/1
25 TABLET ORAL DAILY
COMMUNITY
Start: 2023-11-16

## 2024-06-26 RX ORDER — LEVOTHYROXINE SODIUM 200 UG/1
200 TABLET ORAL DAILY
COMMUNITY
Start: 2023-11-16

## 2024-06-26 RX ORDER — PRAMIPEXOLE DIHYDROCHLORIDE 0.12 MG/1
0.12 TABLET ORAL 2 TIMES DAILY
COMMUNITY
Start: 2024-05-22

## 2024-06-26 RX ORDER — TIOTROPIUM BROMIDE INHALATION SPRAY 3.12 UG/1
2 SPRAY, METERED RESPIRATORY (INHALATION) DAILY
COMMUNITY
Start: 2023-01-30

## 2024-06-26 NOTE — PROGRESS NOTES
NEUROLOGY OUTPATIENT CONSULT NOTE   Jun 26, 2024     CHIEF COMPLAINT/REASON FOR VISIT/REASON FOR CONSULT  Patient presents with:  Foot Problems    REASON FOR CONSULTATION-foot problems    REFERRAL SOURCE  Dr. Maria Esther Ho  CC Dr. Maria Esther Ho    HISTORY OF PRESENT ILLNESS  Chante Tracy is a 63 year old female seen today for evaluation of foot problems.  Symptoms have been going on for the last 6 months to year.  She reports that she will have episodes lasting for 1 day to several days where she will get some spasms in her left foot.  There will also be a rash in the left dorsal portion.  No itching no numbness.  Denies any back pain or any shooting pain down the legs.  There is occasionally some swelling of the right foot though symptoms/rash are mainly on the left side.  She does have a history of restless leg syndrome.  Has tried pramipexole and Flexeril.  Currently is wearing orthopedic shoes.  Does have a history of elevated CK of unclear etiology.  She is concerned about gout.    He has previously had a EMG and does not want to do the needle study.    Previous history is reviewed and this is unchanged.    PAST MEDICAL/SURGICAL HISTORY  History reviewed. No pertinent past medical history.  There is no problem list on file for this patient.  Second for vision loss, depression, cancer/leukemia, high blood pressure, high cholesterol    FAMILY HISTORY  Family History   Problem Relation Age of Onset    Breast Cancer No family hx of    Positive for restless leg syndrome in her mother and sister    SOCIAL HISTORY  Social History     Tobacco Use    Smoking status: Former     Types: Cigarettes       SYSTEMS REVIEW  Twelve-system ROS was done and other than the HPI this was negative/positive for arm and leg pain, weakness/tingling, weakness/paralysis, difficulty walking, sleeping problems due to restless leg syndrome, depression, bloating, bowel problems, respiratory problems, skin changes, weight gain, snoring  loudly when exhausted.    MEDICATIONS  Current Outpatient Medications   Medication Sig Dispense Refill    amitriptyline (ELAVIL) 10 MG tablet Take 10 mg by mouth At Bedtime      artificial saliva (BIOTENE DRY MOUTHWASH) LIQD liquid Swish and spit in mouth 4 times daily      cholecalciferol (VITAMIN D3) 5000 units (125 mcg) capsule Take 5,000 Units by mouth daily      cyclobenzaprine (FLEXERIL) 10 MG tablet Take 10 mg by mouth 3 times daily as needed for muscle spasms      levothyroxine (SYNTHROID/LEVOTHROID) 200 MCG tablet Take 200 mcg by mouth daily      levothyroxine (SYNTHROID/LEVOTHROID) 25 MCG tablet Take 25 mcg by mouth daily      loperamide (IMODIUM A-D) 2 MG tablet Take 2 mg by mouth 4 times daily as needed for diarrhea      omeprazole (PRILOSEC) 20 MG DR capsule Take 20 mg by mouth      pramipexole (MIRAPEX) 0.125 MG tablet Take 0.125 mg by mouth 2 times daily      pramipexole (MIRAPEX) 0.5 MG tablet Take 1 mg by mouth 2 times daily       salsalate (DISALCID) 500 MG tablet Take 1,000 mg by mouth 2 times daily      SPIRIVA RESPIMAT 2.5 MCG/ACT inhaler Inhale 2 puffs into the lungs daily      valsartan (DIOVAN) 160 MG tablet Take 1 tablet by mouth daily at 2 pm      furosemide (LASIX) 10 MG/ML solution Take 5-20 mg by mouth 2 times daily as needed (Patient not taking: Reported on 6/26/2024)      traMADol (ULTRAM) 50 MG tablet Take 50 mg by mouth every 6 hours as needed for severe pain (Patient not taking: Reported on 6/26/2024)      valsartan (DIOVAN) 40 MG tablet Take 40 mg by mouth 2 times daily  (Patient not taking: Reported on 6/26/2024)       No current facility-administered medications for this visit.        PHYSICAL EXAMINATION  VITALS: BP (!) 156/91   Pulse 94   Resp 20   Wt 108.9 kg (240 lb)   GENERAL: Healthy appearing, alert, no acute distress, normal habitus.  CARDIOVASCULAR: Extremities warm and well perfused. Pulses present.   NEUROLOGICAL:  Patient is awake and oriented to self, place and  time.  Attention span is normal.  Memory is grossly intact.  Language is fluent and follows commands appropriately.  Appropriate fund of knowledge. Cranial nerves 2-12 are intact. There is no pronator drift.  Motor exam shows 5/5 strength in all extremities.  Tone is symmetric bilaterally in upper and lower extremities.  Reflexes are symmetric and 1+ in upper extremities and lower extremities. Sensory exam is grossly intact to light touch, pin prick and vibration.  Finger to nose and heel to shin is without dysmetria.  Romberg is negative.  Gait is normal and the patient is able to do tandem walk and walk on toes and heels.    DIAGNOSTICS  RELEVANT LABS  TSH 1.32  CMP noncontributory  TSH 4.76    OUTSIDE RECORDS  Outside referral notes and chart notes were reviewed and pertinent information has been summarized (in addition to the HPI):-        IMPRESSION/REPORT/PLAN  Rash  Burning sensation of feet  Muscle spasms    This is a 63 year old female with episodes of left dorsal foot rash with burning pain and muscle spasms in the left foot.  These episodes last for about 1 day without any provoking factor.  No other previous autoimmune disease issues that does have a history of thyroid cancer.  Occasionally can get some swelling in the right foot.    To evaluate for neurological causes we will check blood work as well as EMG.  She does not want to do the needle study of the EMG.  Will also check a CK since she has some history of elevated CK.      Symptoms could be related to autoimmune disease.  If testing is negative would recommend referral to rheumatology.  Symptoms could be nonneurological.    Return in about 2 months (around 8/26/2024) for In-Clinic Visit (must), Add on PAOR, After testing.    -     Vitamin B6; Future  -     Vitamin B12; Future  -     Vitamin B1 whole blood; Future  -     Protein electrophoresis; Future  -     Protein Immunofixation Serum; Future  -     Lyme Disease Total Abs Bld with Reflex to  Confirm CLIA; Future  -     Copper level; Future  -     Anti Nuclear Juliet IgG by IFA with Reflex; Future  -     Uric acid; Future  -     Hemoglobin A1c; Future  -     EMG; Future  -     CK total; Future    Over 60 minutes were spent coordinating the care for the patient on the day of the encounter.  This includes previsit, during visit and post visit activities as documented above.  Counseling patient.  Reviewing chart prior multiple test reviewed/ordered.  (Activities include but not inclusive of reviewing chart, reviewing outside records, reviewing labs and imaging study results as well as the images, patient visit time including getting history and exam,  use if applicable, review of test results with the patient and coming up with a plan in a shared model, counseling patient and family, education and answering patient questions, EMR , EMR diagnosis entry and problem list management, medication reconciliation and prescription management if applicable, paperwork if applicable, printing documents and documentation of the visit activities.)        Bipin Miller MD  Neurologist  [facility]  Tel:- [phone]    This note was dictated using voice recognition software.  Any grammatical or context distortions are unintentional and inherent to the software.

## 2024-06-27 LAB
ALBUMIN SERPL ELPH-MCNC: 4.2 G/DL (ref 3.7–5.1)
ALPHA1 GLOB SERPL ELPH-MCNC: 0.3 G/DL (ref 0.2–0.4)
ALPHA2 GLOB SERPL ELPH-MCNC: 0.8 G/DL (ref 0.5–0.9)
ANA PAT SER IF-IMP: ABNORMAL
ANA SER QL IF: POSITIVE
ANA TITR SER IF: ABNORMAL {TITER}
B BURGDOR IGG+IGM SER QL: 0.99
B-GLOBULIN SERPL ELPH-MCNC: 0.9 G/DL (ref 0.6–1)
GAMMA GLOB SERPL ELPH-MCNC: 1 G/DL (ref 0.7–1.6)
M PROTEIN SERPL ELPH-MCNC: 0 G/DL
PATH REPORT.COMMENTS IMP SPEC: NORMAL
PATH REPORT.COMMENTS IMP SPEC: NORMAL
PROT PATTERN SERPL ELPH-IMP: NORMAL
PROT PATTERN SERPL IFE-IMP: NORMAL

## 2024-06-28 LAB
B BURGDOR IGG SERPL QL IA: 0.06 INDEX
B BURGDOR IGG SERPL QL IA: NONREACTIVE
B BURGDOR IGM SERPL QL IA: 0.27 INDEX
B BURGDOR IGM SERPL QL IA: NONREACTIVE
COPPER SERPL-MCNC: 129.5 UG/DL

## 2024-06-29 LAB — PYRIDOXAL PHOS SERPL-SCNC: 19.4 NMOL/L

## 2024-07-01 LAB — VIT B1 PYROPHOSHATE BLD-SCNC: 117 NMOL/L

## 2024-07-14 ENCOUNTER — HEALTH MAINTENANCE LETTER (OUTPATIENT)
Age: 64
End: 2024-07-14

## 2024-07-25 ENCOUNTER — ANCILLARY PROCEDURE (OUTPATIENT)
Dept: MAMMOGRAPHY | Facility: CLINIC | Age: 64
End: 2024-07-25
Attending: FAMILY MEDICINE
Payer: COMMERCIAL

## 2024-07-25 DIAGNOSIS — Z12.39 SCREENING FOR BREAST CANCER: ICD-10-CM

## 2024-07-25 PROCEDURE — 77063 BREAST TOMOSYNTHESIS BI: CPT

## 2024-08-01 ENCOUNTER — LAB (OUTPATIENT)
Dept: LAB | Facility: CLINIC | Age: 64
End: 2024-08-01
Payer: COMMERCIAL

## 2024-08-01 ENCOUNTER — OFFICE VISIT (OUTPATIENT)
Dept: RHEUMATOLOGY | Facility: CLINIC | Age: 64
End: 2024-08-01
Attending: PSYCHIATRY & NEUROLOGY
Payer: COMMERCIAL

## 2024-08-01 VITALS
HEART RATE: 102 BPM | OXYGEN SATURATION: 94 % | WEIGHT: 236.5 LBS | SYSTOLIC BLOOD PRESSURE: 134 MMHG | DIASTOLIC BLOOD PRESSURE: 74 MMHG

## 2024-08-01 DIAGNOSIS — Z85.850 HX OF THYROID CANCER: ICD-10-CM

## 2024-08-01 DIAGNOSIS — R76.8 POSITIVE ANA (ANTINUCLEAR ANTIBODY): ICD-10-CM

## 2024-08-01 DIAGNOSIS — R74.8 ELEVATED CK: ICD-10-CM

## 2024-08-01 DIAGNOSIS — R76.8 POSITIVE ANA (ANTINUCLEAR ANTIBODY): Primary | ICD-10-CM

## 2024-08-01 PROCEDURE — 86160 COMPLEMENT ANTIGEN: CPT

## 2024-08-01 PROCEDURE — 36415 COLL VENOUS BLD VENIPUNCTURE: CPT

## 2024-08-01 PROCEDURE — G2211 COMPLEX E/M VISIT ADD ON: HCPCS | Performed by: INTERNAL MEDICINE

## 2024-08-01 PROCEDURE — 99204 OFFICE O/P NEW MOD 45 MIN: CPT | Performed by: INTERNAL MEDICINE

## 2024-08-01 PROCEDURE — 86225 DNA ANTIBODY NATIVE: CPT

## 2024-08-01 PROCEDURE — 86235 NUCLEAR ANTIGEN ANTIBODY: CPT

## 2024-08-01 RX ORDER — MULTIVITAMIN
1 TABLET ORAL DAILY
COMMUNITY

## 2024-08-01 NOTE — PROGRESS NOTES
This document was created using a software with less than 100% fidelity, at times resulting in unintended, even erroneous syntax and grammar.  The reader is advised to keep this under consideration while reviewing, interpreting this note.      Rheumatology Consult Note      Chante Tracy     YOB: 1960 Age: 63 year old    Date of visit: 8/01/24    PCP: Maria Esther Ho    Chief Complaint   Patient presents with:  Consult      Assessment and Plan     Chante was seen today for consult.    Diagnoses and all orders for this visit:    Positive MAURY (antinuclear antibody)  -     Adult Rheumatology  Referral  -     LYRIC antibody panel; Future  -     DNA double stranded antibodies; Future  -     Complement C4; Future  -     Complement C3; Future    Elevated CK    Hx of thyroid cancer           This patient has positive MAURY.  This was drawn in the context of rash of the left foot, burning sensation.   She has hypertension.  She has bronchial asthma She has no features to suggest active connective tissue disease.  This is discussed further workup is indicated we will check those data today.  Once these are available we can meet her in person for the course to be charted accordingly.  Her longstanding mild elevation of CK dating back to 2018 does not appear to be associated with signals of inflammatory myositis such as polymyositis/dermatomyositis.  As noted  She has history of thyroid cancer.At this stage no further workup from a rheumatologic perspective would be deemed indicated.    So lets do the blood test today and I will have you come back and see me had a couple of months so again this represents a immune abnormality is not causing any diseaseThe longitudinal plan of care for the diagnosis(es)/condition(s) as documented were addressed during this visit. Due to the added complexity in care, I will continue to support Chante in the subsequent management and with ongoing continuity of  "care.    Return to clinic: 2 months      HPI   Chante Tracy is a 63 year old female  is seen today for evaluation of a positive antinuclear antibody at 1: 116, left-sided when she was evaluated for this in neurology..,  Homogenous.  This was drawn as a part of workup of \"spasms, rash on her foot she has history of goiter status post thyroidectomy turned out to have papillary thyroid cancer.  She follows up with endocrine for this.  In this background when an MAURY was drawn in neurology it came back positive.  She reports no photosensitivity, facial eruption, mouth ulcers, pleuritic symptoms, DVT PE, history of seizure disorder, features suggestive of Raynaud's, hair loss, she has had 3 pregnancies 1 resulted in miscarriage #2 daughter who had birth defects, #3 was aborted.  She reports no features suggestive of Raynaud's.  She has not had kidney issues that she is aware of.  In terms of her joint pains she reports no pain swelling stiffness in any of her joint areas including upper and lower extremity appendicular system.  She has had some discomfort in her left foot where she has burning sensation.  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  She has longstanding intermittent elevated CK.  This dates back to almost 6 years to 2018.  These are fluctuating numbers.  She has not had features suggestive of weakness subjectively in the upper or lower extremities, swallowing difficulty or difficulty breathing besides her asthma.  She also carries a diagnosis of restless leg syndrome.  When she was seen at the neurology recently there were no motor deficits noted with motor exam showing 5 out of 5 strength in all extremities.  Her tone was felt to be symmetrical and reflexes intact and symmetrical..     In the family history she noted her aunt who has history of SLE.  She works at the local [employer] as a daily .  She is on her feet during " this time for several hours.  She does not smoke Having quit 14 years ago.  Active Problem List   There is no problem list on file for this patient.    Past Medical History   No past medical history on file.  Past Surgical History   No past surgical history on file.  Allergy     Allergies   Allergen Reactions    Celecoxib Hives and Nausea and Vomiting    Hydrochlorothiazide W-Triamterene Hives     Nose bleeds    Indapamide Hives    Lisinopril Hives    Penicillins Other (See Comments) and Hives     Family hx    Simvastatin Hives    Bacitracin Other (See Comments)     Eats away skin    Belladonna GI Disturbance     Can't digest    Ethacrynic Acid Other (See Comments)     Slept all day    Fish Allergy GI Disturbance     Can't digest    Hydrochlorothiazide Other (See Comments)    Ibuprofen Other (See Comments)     Causes ulcers   Eats lining of stomach    Causes ulcers    Triamterene Hives     Nose bleeds    Budesonide-Formoterol Fumarate Rash    Diazepam Other (See Comments) and Palpitations     Heart racing    Menthol Rash     Icy Hot patch    Neomycin Rash    Neomycin-Bacitracin Zn-Polymyx Rash     Eats away skin    Polymyxin B Rash     Eats away skin    Sulfa Antibiotics Hives and Other (See Comments)     Family hx     Current Medication List     Current Outpatient Medications   Medication Sig Dispense Refill    amitriptyline (ELAVIL) 10 MG tablet Take 10 mg by mouth At Bedtime      artificial saliva (BIOTENE DRY MOUTHWASH) LIQD liquid Swish and spit in mouth 4 times daily      cholecalciferol (VITAMIN D3) 5000 units (125 mcg) capsule Take 5,000 Units by mouth daily      cyclobenzaprine (FLEXERIL) 10 MG tablet Take 10 mg by mouth 3 times daily as needed for muscle spasms      furosemide (LASIX) 10 MG/ML solution Take 5-20 mg by mouth 2 times daily as needed (Patient not taking: Reported on 6/26/2024)      levothyroxine (SYNTHROID/LEVOTHROID) 200 MCG tablet Take 200 mcg by mouth daily      levothyroxine  (SYNTHROID/LEVOTHROID) 25 MCG tablet Take 25 mcg by mouth daily      loperamide (IMODIUM A-D) 2 MG tablet Take 2 mg by mouth 4 times daily as needed for diarrhea      omeprazole (PRILOSEC) 20 MG DR capsule Take 20 mg by mouth      pramipexole (MIRAPEX) 0.125 MG tablet Take 0.125 mg by mouth 2 times daily      pramipexole (MIRAPEX) 0.5 MG tablet Take 1 mg by mouth 2 times daily       salsalate (DISALCID) 500 MG tablet Take 1,000 mg by mouth 2 times daily      SPIRIVA RESPIMAT 2.5 MCG/ACT inhaler Inhale 2 puffs into the lungs daily      traMADol (ULTRAM) 50 MG tablet Take 50 mg by mouth every 6 hours as needed for severe pain (Patient not taking: Reported on 6/26/2024)      valsartan (DIOVAN) 160 MG tablet Take 1 tablet by mouth daily at 2 pm      valsartan (DIOVAN) 40 MG tablet Take 40 mg by mouth 2 times daily  (Patient not taking: Reported on 6/26/2024)       No current facility-administered medications for this visit.       No current facility-administered medications for this visit.        Family History     Family History   Problem Relation Age of Onset    Breast Cancer No family hx of          Physical Exam     COMPREHENSIVE EXAMINATION:  Vitals:    08/01/24 1343   BP: 134/74   BP Location: Right arm   Patient Position: Sitting   Cuff Size: Adult Large   Pulse: 102   SpO2: 94%   Weight: 107.3 kg (236 lb 8 oz)     A well appearing alert oriented female. Vital data as noted above. Her eyes examined for inflammation/scleromalacia. ENT examined for oral mucositis, moisture, thrush, nasal deformity, external ear redness, deformity. Her neck is examined for suppleness and lymphadenopathy.  Cardiopulmonary examination without dyspnea at rest, use of accessory muscles of breathing, wheezing, edema, peripheral or central cyanosis.  Abdomen examined for softness, tenderness, obvious organomegaly.  Skin examined for heliotrope, malar area eruption, lupus pernio, periungual erythema, sclerodactyly, papules, erythema  nodosum, purpura, nail pitting, onycholysis, and obvious psoriasis lesion. Neurological examination done for alertness, speech, facial symmetry,  tone and power in upper and lower extremities, and gait. The joint examination is performed for swelling, tenderness, warmth, erythema, and range of motion in the following joints: DIPs, PIPs, MCPs, wrists, first CMC's, elbows, shoulders, hips, knees, ankles, feet; spine for range of motion and paraspinal muscles for tenderness. The salient normal / abnormal findings are appended.  She does not have synovitis or palpable joints of upper and lower extremities.  She does not have a rash on her face, stomatitis, sclerodactyly, periungual erythema.  There is no tenderness across trapezius along the paraspinal region upper or lower extremities proximally or distally.  She has area of pigmentation, brownish along the dorsum of her left foot.  This is not a vasculopathic appearing lesion.  There is no dactylitis of digits or toes that she does not have enthesopathy.  She has Heberden nodes, especially middle fingers.  She has upper and lower dentures done at age 52s she feels secondary to repeat chickenpox episode when she was 29 years of age.    Labs / Imaging (select studies)     Uric Acid   Date Value Ref Range Status   06/26/2024 4.3 2.4 - 5.7 mg/dL Final      Hemoglobin   Date Value Ref Range Status   11/30/2022 12.3 11.7 - 15.7 g/dL Final     Urea Nitrogen   Date Value Ref Range Status   06/17/2024 22.6 8.0 - 23.0 mg/dL Final   05/06/2024 20.5 8.0 - 23.0 mg/dL Final   04/10/2023 17.7 8.0 - 23.0 mg/dL Final   08/30/2021 19 8 - 22 mg/dL Final   11/03/2020 21 8 - 22 mg/dL Final   04/21/2020 18 8 - 22 mg/dL Final     AST   Date Value Ref Range Status   11/30/2022 25 10 - 35 U/L Final   11/12/2019 31 0 - 40 U/L Final   05/29/2019 24 0 - 40 U/L Final     Albumin   Date Value Ref Range Status   11/30/2022 4.0 3.5 - 5.2 g/dL Final   11/12/2019 4.1 3.5 - 5.0 g/dL Final   05/29/2019  3.6 3.5 - 5.0 g/dL Final   11/14/2018 4.0 3.5 - 5.0 g/dL Final     Alkaline Phosphatase   Date Value Ref Range Status   11/30/2022 101 35 - 104 U/L Final   11/12/2019 104 45 - 120 U/L Final   05/29/2019 90 45 - 120 U/L Final     ALT   Date Value Ref Range Status   11/30/2022 32 10 - 35 U/L Final   11/12/2019 33 0 - 45 U/L Final   05/29/2019 22 0 - 45 U/L Final          Immunization History     There is no immunization history on file for this patient.

## 2024-08-02 LAB
C3 SERPL-MCNC: 153 MG/DL (ref 81–157)
C4 SERPL-MCNC: 27 MG/DL (ref 13–39)
DSDNA AB SER-ACNC: 1.5 IU/ML
ENA SM IGG SER IA-ACNC: <0.7 U/ML
ENA SM IGG SER IA-ACNC: NEGATIVE
ENA SS-A AB SER IA-ACNC: <0.5 U/ML
ENA SS-A AB SER IA-ACNC: NEGATIVE
ENA SS-B IGG SER IA-ACNC: <0.6 U/ML
ENA SS-B IGG SER IA-ACNC: NEGATIVE
U1 SNRNP IGG SER IA-ACNC: <1.1 U/ML
U1 SNRNP IGG SER IA-ACNC: NEGATIVE

## 2024-10-03 ENCOUNTER — OFFICE VISIT (OUTPATIENT)
Dept: RHEUMATOLOGY | Facility: CLINIC | Age: 64
End: 2024-10-03
Payer: COMMERCIAL

## 2024-10-03 VITALS
HEART RATE: 98 BPM | WEIGHT: 236.1 LBS | SYSTOLIC BLOOD PRESSURE: 150 MMHG | DIASTOLIC BLOOD PRESSURE: 80 MMHG | OXYGEN SATURATION: 96 %

## 2024-10-03 DIAGNOSIS — M25.50 POLYARTHRALGIA: Primary | ICD-10-CM

## 2024-10-03 DIAGNOSIS — R76.8 ANA POSITIVE: ICD-10-CM

## 2024-10-03 DIAGNOSIS — R74.8 ELEVATED CK: ICD-10-CM

## 2024-10-03 PROCEDURE — G2211 COMPLEX E/M VISIT ADD ON: HCPCS | Performed by: INTERNAL MEDICINE

## 2024-10-03 PROCEDURE — 99214 OFFICE O/P EST MOD 30 MIN: CPT | Performed by: INTERNAL MEDICINE

## 2024-10-03 ASSESSMENT — PAIN SCALES - GENERAL: PAINLEVEL: EXTREME PAIN (9)

## 2024-10-03 NOTE — PROGRESS NOTES
"      Rheumatology follow-up visit note     Chante is a 63 year old female presents today for follow-up.    Chante was seen today for recheck.    Diagnoses and all orders for this visit:    Polyarthralgia  -     ALT; Future  -     Albumin level; Future  -     CBC with Platelets & Differential; Future  -     Complement C3; Future  -     Complement C4; Future  -     CK total; Future  -     Creatinine; Future  -     DNA double stranded antibodies; Future    MAURY positive  -     ALT; Future  -     Albumin level; Future  -     CBC with Platelets & Differential; Future  -     Complement C3; Future  -     Complement C4; Future  -     CK total; Future  -     Creatinine; Future  -     DNA double stranded antibodies; Future    Elevated CK  -     CK total; Future        This patient comes in for follow-up of recent visit for evaluation of positive MAURY.  Not withstanding the elevated CK for which she is undergoing workup with neurology.  Does not appear to have clear evidence of inflammatory joint disease, connective tissue disease.  We will meet here again once her EMG and her workup is done at neurology.  Labs as noted.  The longitudinal plan of care for the diagnosis(es)/condition(s) as documented were addressed during this visit. Due to the added complexity in care, I will continue to support Chante in the subsequent management and with ongoing continuity of care.      Follow up in 4 months.    HPI    Chante Tracy is a 63 year old female is here for follow-up of   evaluation of a positive antinuclear antibody at 1: 160, With the homogenous pattern.  This was drawn as a part of workup of \"spasms, rash on her foot she has history of goiter status post thyroidectomy turned out to have papillary thyroid cancer.  Since her visit on August 1 her workup is back, she does not have hypocomplementemia, her dsDNA LYRIC's are normal.  She has had modest elevation of CK at 546 and is due to have an EMG in the next few days.  She follows " "up with endocrine for this.  In this background when an MAURY was drawn in neurology it came back positive.  She reports no photosensitivity, facial eruption, mouth ulcers, pleuritic symptoms, DVT PE, history of seizure disorder, features suggestive of Raynaud's, hair loss, she has had 3 pregnancies 1 resulted in miscarriage #2 daughter who had birth defects, #3 was aborted.  She reports no features suggestive of Raynaud's.  She has not had kidney issues that she is aware of.  In terms of her joint pains she reports no pain swelling stiffness in any of her joint areas including upper and lower extremity appendicular system.  She has had some discomfort in her left foot where she has burning sensation.  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  She has longstanding intermittent elevated CK.  This dates back to almost 6 years to 2018.  These are fluctuating numbers.  She has not had features suggestive of weakness subjectively in the upper or lower extremities, swallowing difficulty or difficulty breathing besides her asthma.  She also carries a diagnosis of restless leg syndrome.  When she was seen at the neurology recently there were no motor deficits noted with motor exam showing 5 out of 5 strength in all extremities.  Her tone was felt to be symmetrical and reflexes intact and symmetrical.     DETAILED EXAMINATION  10/03/24  :    Vitals:    10/03/24 0923   BP: (!) 150/80   BP Location: Right arm   Patient Position: Sitting   Cuff Size: Adult Large   Pulse: 98   SpO2: 96%   Weight: 107.1 kg (236 lb 1.6 oz)     Alert oriented. Head including the face is examined for malar rash, heliotropes, scarring, lupus pernio. Eyes examined for redness such as in episcleritis/scleritis, periorbital lesions.   Neck/ Face examined for parotid gland swelling, range of motion of neck.  Left upper and lower and right upper and lower extremities examined for " tenderness, swelling, warmth of the appendicular joints, range of motion, edema, rash.  Some of the important findings included: she does not have evidence of synovitis in the palpable joints of the upper extremities.  No significant deformities of the digits.  There is no rash on her face there is no sclerodactyly periungual erythema she has intact tone power in proximal and distal upper and lower extremities.  There are no problems to display for this patient.    No past surgical history on file.   No past medical history on file.  Allergies   Allergen Reactions    Celecoxib Hives and Nausea and Vomiting    Hydrochlorothiazide W-Triamterene Hives     Nose bleeds    Indapamide Hives    Lisinopril Hives    Penicillins Other (See Comments) and Hives     Family hx    Simvastatin Hives    Bacitracin Other (See Comments)     Eats away skin    Belladonna GI Disturbance     Can't digest    Ethacrynic Acid Other (See Comments)     Slept all day    Fish Allergy GI Disturbance     Can't digest    Hydrochlorothiazide Other (See Comments)    Ibuprofen Other (See Comments)     Causes ulcers   Eats lining of stomach    Causes ulcers    Triamterene Hives     Nose bleeds    Budesonide-Formoterol Fumarate Rash    Diazepam Other (See Comments) and Palpitations     Heart racing    Menthol Rash     Icy Hot patch    Neomycin Rash    Neomycin-Bacitracin Zn-Polymyx Rash     Eats away skin    Polymyxin B Rash     Eats away skin    Sulfa Antibiotics Hives and Other (See Comments)     Family hx     Current Outpatient Medications   Medication Sig Dispense Refill    amitriptyline (ELAVIL) 10 MG tablet Take 10 mg by mouth At Bedtime      cholecalciferol (VITAMIN D3) 5000 units (125 mcg) capsule Take 5,000 Units by mouth daily      cyclobenzaprine (FLEXERIL) 10 MG tablet Take 10 mg by mouth 3 times daily as needed for muscle spasms      levothyroxine (SYNTHROID/LEVOTHROID) 200 MCG tablet Take 200 mcg by mouth daily      levothyroxine  (SYNTHROID/LEVOTHROID) 25 MCG tablet Take 25 mcg by mouth daily      loperamide (IMODIUM A-D) 2 MG tablet Take 2 mg by mouth 4 times daily as needed for diarrhea      multivitamin w/minerals (MULTI-VITAMIN) tablet Take 1 tablet by mouth daily      omeprazole (PRILOSEC) 20 MG DR capsule Take 20 mg by mouth      pramipexole (MIRAPEX) 0.125 MG tablet Take 0.125 mg by mouth 2 times daily      pramipexole (MIRAPEX) 0.5 MG tablet Take 1 mg by mouth 2 times daily       salsalate (DISALCID) 500 MG tablet Take 1,000 mg by mouth 2 times daily      SPIRIVA RESPIMAT 2.5 MCG/ACT inhaler Inhale 2 puffs into the lungs daily      valsartan (DIOVAN) 160 MG tablet Take 1 tablet by mouth daily at 2 pm       family history is not on file.  Social Connections: Unknown (2/3/2023)    [facility]    Social Connections     Frequency of Communication with Friends and Family: Not on file          WBC Count   Date Value Ref Range Status   11/30/2022 4.7 4.0 - 11.0 10e3/uL Final     RBC Count   Date Value Ref Range Status   11/30/2022 3.68 (L) 3.80 - 5.20 10e6/uL Final     Hemoglobin   Date Value Ref Range Status   11/30/2022 12.3 11.7 - 15.7 g/dL Final     Hematocrit   Date Value Ref Range Status   11/30/2022 33.9 (L) 35.0 - 47.0 % Final     MCV   Date Value Ref Range Status   11/30/2022 92 78 - 100 fL Final     MCH   Date Value Ref Range Status   11/30/2022 33.4 (H) 26.5 - 33.0 pg Final     Platelet Count   Date Value Ref Range Status   11/30/2022 207 150 - 450 10e3/uL Final     AST   Date Value Ref Range Status   11/30/2022 25 10 - 35 U/L Final     ALT   Date Value Ref Range Status   11/30/2022 32 10 - 35 U/L Final     Albumin   Date Value Ref Range Status   11/30/2022 4.0 3.5 - 5.2 g/dL Final   11/12/2019 4.1 3.5 - 5.0 g/dL Final     Alkaline Phosphatase   Date Value Ref Range Status   11/30/2022 101 35 - 104 U/L Final     Creatinine   Date Value Ref Range Status   06/17/2024 0.85 0.51 - 0.95  mg/dL Final     GFR Estimate   Date Value Ref Range Status   06/17/2024 78 >60 mL/min/1.73m2 Final     Comment:     eGFR calculated using 2021 CKD-EPI equation.   11/03/2020 >60 >60 mL/min/1.73m2 Final     GFR Estimate If Black   Date Value Ref Range Status   11/03/2020 >60 >60 mL/min/1.73m2 Final

## 2024-10-11 ENCOUNTER — MYC MEDICAL ADVICE (OUTPATIENT)
Dept: NEUROLOGY | Facility: CLINIC | Age: 64
End: 2024-10-11
Payer: COMMERCIAL

## 2024-11-13 ENCOUNTER — OFFICE VISIT (OUTPATIENT)
Dept: NEUROLOGY | Facility: CLINIC | Age: 64
End: 2024-11-13
Payer: COMMERCIAL

## 2024-11-13 ENCOUNTER — OFFICE VISIT (OUTPATIENT)
Dept: NEUROLOGY | Facility: CLINIC | Age: 64
End: 2024-11-13
Attending: PSYCHIATRY & NEUROLOGY
Payer: COMMERCIAL

## 2024-11-13 VITALS
DIASTOLIC BLOOD PRESSURE: 100 MMHG | BODY MASS INDEX: 41.82 KG/M2 | WEIGHT: 236 LBS | SYSTOLIC BLOOD PRESSURE: 150 MMHG | HEART RATE: 88 BPM | HEIGHT: 63 IN

## 2024-11-13 DIAGNOSIS — R20.8 BURNING SENSATION OF FEET: Primary | ICD-10-CM

## 2024-11-13 DIAGNOSIS — R20.8 BURNING SENSATION OF FEET: ICD-10-CM

## 2024-11-13 DIAGNOSIS — R74.8 ELEVATED CK: ICD-10-CM

## 2024-11-13 DIAGNOSIS — R76.8 POSITIVE ANA (ANTINUCLEAR ANTIBODY): ICD-10-CM

## 2024-11-13 DIAGNOSIS — E53.1 VITAMIN B6 DEFICIENCY: ICD-10-CM

## 2024-11-13 PROCEDURE — 95910 NRV CNDJ TEST 7-8 STUDIES: CPT | Performed by: PSYCHIATRY & NEUROLOGY

## 2024-11-13 RX ORDER — GABAPENTIN 100 MG/1
100 CAPSULE ORAL 2 TIMES DAILY
Qty: 180 CAPSULE | Refills: 1 | Status: SHIPPED | OUTPATIENT
Start: 2024-11-13

## 2024-11-13 NOTE — LETTER
11/13/2024      Chante Tracy  [address]  [place] [facility] [address]      Dear Colleague,    Thank you for referring your patient, Chante Tracy, to the [facility] [place]. Please see a copy of my visit note below.    See procedure note.       Again, thank you for allowing me to participate in the care of your patient.        Sincerely,        Bipin Miller MD

## 2024-11-13 NOTE — LETTER
11/13/2024      Chante Tracy  [address]  [place] [address]      Dear Colleague,    Thank you for referring your patient, Chante Tracy, to the [facility] [place]. Please see a copy of my visit note below.    NEUROLOGY OUTPATIENT PROGRESS NOTE   Nov 13, 2024     CHIEF COMPLAINT/REASON FOR VISIT/REASON FOR CONSULT  Patient presents with:  Results: EMG results    REASON FOR CONSULTATION-foot problems    REFERRAL SOURCE  Dr. Maria Esther Ho  CC Dr. Maria Esther Ho    HISTORY OF PRESENT ILLNESS  Chante Tracy is a 64 year old female seen today for evaluation of foot problems.  Symptoms have been going on for the last 6 months to year.  She reports that she will have episodes lasting for 1 day to several days where she will get some spasms in her left foot.  There will also be a rash in the left dorsal portion.  No itching no numbness.  Denies any back pain or any shooting pain down the legs.  There is occasionally some swelling of the right foot though symptoms/rash are mainly on the left side.  She does have a history of restless leg syndrome.  Has tried pramipexole and Flexeril.  Currently is wearing orthopedic shoes.  Does have a history of elevated CK of unclear etiology.  She is concerned about gout.    He has previously had a EMG and does not want to do the needle study.    11/13/24  Patient returns today.  Symptoms are about the same.  Standing makes the pain symptoms worse.  Reports about a rash on the foot as well.  Has seen rheumatology and no rheumatology could diagnosis been made.  Does have a history of a herniated disc at the L4 level.  Does report some muscle loss in both legs.  She tried the gabapentin though could not tolerate it.  Is on a multivitamin with B6.    Previous history is reviewed and this is unchanged.    PAST MEDICAL/SURGICAL HISTORY  No past medical history on file.  There is no problem list on file for this patient.  Second for vision loss,  depression, cancer/leukemia, high blood pressure, high cholesterol    FAMILY HISTORY  Family History   Problem Relation Age of Onset     Breast Cancer No family hx of    Positive for restless leg syndrome in her mother and sister    SOCIAL HISTORY  Social History     Tobacco Use     Smoking status: Former     Types: Cigarettes       SYSTEMS REVIEW  Twelve-system ROS was done and other than the HPI this was negative/positive for arm and leg pain, weakness/tingling, weakness/paralysis, difficulty walking, sleeping problems due to restless leg syndrome, depression, bloating, bowel problems, respiratory problems, skin changes, weight gain, snoring loudly when exhausted.    MEDICATIONS  Current Outpatient Medications   Medication Sig Dispense Refill     amitriptyline (ELAVIL) 10 MG tablet Take 10 mg by mouth At Bedtime       calcium citrate-vitamin D (CITRACAL) 200-6.25 MG-MCG TABS per tablet Take 1 tablet by mouth 2 times daily.       cholecalciferol (VITAMIN D3) 5000 units (125 mcg) capsule Take 5,000 Units by mouth daily       cyclobenzaprine (FLEXERIL) 10 MG tablet Take 10 mg by mouth 3 times daily as needed for muscle spasms       gabapentin (NEURONTIN) 100 MG capsule Take 1 capsule (100 mg) by mouth 2 times daily. 180 capsule 1     levothyroxine (SYNTHROID/LEVOTHROID) 200 MCG tablet Take 200 mcg by mouth daily       levothyroxine (SYNTHROID/LEVOTHROID) 25 MCG tablet Take 25 mcg by mouth daily       loperamide (IMODIUM A-D) 2 MG tablet Take 2 mg by mouth 4 times daily as needed for diarrhea       multivitamin w/minerals (MULTI-VITAMIN) tablet Take 1 tablet by mouth daily       omeprazole (PRILOSEC) 20 MG DR capsule Take 20 mg by mouth       pramipexole (MIRAPEX) 0.125 MG tablet Take 0.125 mg by mouth 2 times daily       pramipexole (MIRAPEX) 0.5 MG tablet Take 1 mg by mouth 2 times daily        salsalate (DISALCID) 500 MG tablet Take 1,000 mg by mouth 2 times daily       SPIRIVA RESPIMAT 2.5 MCG/ACT inhaler Inhale 2  "puffs into the lungs daily       valsartan (DIOVAN) 160 MG tablet Take 1 tablet by mouth daily at 2 pm       No current facility-administered medications for this visit.        PHYSICAL EXAMINATION  VITALS: BP (!) 150/100 (BP Location: Right arm, Patient Position: Sitting)   Pulse 88   Ht 1.6 m (5' 3\")   Wt 107 kg (236 lb)   BMI 41.81 kg/m    GENERAL: Healthy appearing, alert, no acute distress, normal habitus.  CARDIOVASCULAR: Extremities warm and well perfused. Pulses present.   NEUROLOGICAL:  Patient is awake and oriented to self, place and time.  Attention span is normal.  Memory is grossly intact.  Language is fluent and follows commands appropriately.  Appropriate fund of knowledge. Cranial nerves 2-12 are intact. There is no pronator drift.  Motor exam shows 5/5 strength in all extremities.  Tone is symmetric bilaterally in upper and lower extremities.  Reflexes are symmetric and 1+ in upper extremities and lower extremities. Sensory exam is grossly intact to light touch, pin prick and vibration.  Finger to nose and heel to shin is without dysmetria.  Romberg is negative.  Gait is normal and the patient is able to do tandem walk and walk on toes and heels.  Exam similar to before.    DIAGNOSTICS  RELEVANT LABS  TSH 1.32  CMP noncontributory  TSH 4.76    OUTSIDE RECORDS  Outside referral notes and chart notes were reviewed and pertinent information has been summarized (in addition to the HPI):-    Labs    Component      Latest Ref Rn 6/26/2024  11:40 AM   Albumin Fraction      3.7 - 5.1 g/dL 4.2    Alpha 1 Fraction      0.2 - 0.4 g/dL 0.3    Alpha 2 Fraction      0.5 - 0.9 g/dL 0.8    Beta Fraction      0.6 - 1.0 g/dL 0.9    Gamma Fraction      0.7 - 1.6 g/dL 1.0    Monoclonal Peak      <=0.0 g/dL 0.0    ELP Interpretation: Essentially normal electrophoretic pattern. No obvious monoclonal proteins seen. Pathologic significance requires clinical correlation. ALYSSIA Trimble M.D., Ph.D., Pathologist ([phone]).    Signout Location if Remote [facility]    Signout Location if Remote [facility]    Lyme Confirm IgG by CLIA Instrument Value      <0.90 Index 0.06    Lyme Confirm IgG by CLIA Blood      Nonreactive  Nonreactive    Lyme Confirm IgM by CLIA Instrument Value      <0.90 Index 0.27    Lyme Confirm IgM by CLIA Blood      Nonreactive  Nonreactive    MAURY interpretation      Negative  Positive !    MAURY pattern 1 Homogeneous    MAURY titer 1 1:160    Immunofixation ELP No monoclonal protein seen on immunofixation. Pathologic significance requires clinical correlation.  ALYSSIA Trimble M.D., Ph.D., Pathologist ()    Vitamin B6      20.0 - 125.0 nmol/L 19.4 (L)    Vitamin B12      232 - 1,245 pg/mL 569    Vitamin B1 Whole Blood Level      70 - 180 nmol/L 117    Lyme Disease Antibodies Serum      <0.90  0.99 (H)    Copper      80.0 - 155.0 ug/dL 129.5    Uric Acid      2.4 - 5.7 mg/dL 4.3    Hemoglobin A1C      <5.7 % 5.4    CK Total      26 - 192 U/L 546 (H)    Total Protein Serum for ELP      6.4 - 8.3 g/dL 7.2       Legend:  ! Abnormal  (L) Low  (H) High    EMG  CLINICAL INTERPRETATION:  This is a normal nerve conduction study.  The EMG study was not done.  The low normal amplitude of the left tibial motor nerve conduction study is of unclear significance.  Further clinical correlation is needed.     IMPRESSION/REPORT/PLAN  Rash  Burning sensation of feet  Muscle spasms  Positive MAURY  Low B6  Elevated CK    This is a 64 year old female with episodes of left dorsal foot rash with burning pain and muscle spasms in the left foot.  These episodes last for about 1 day without any provoking factor.  No other previous autoimmune disease issues that does have a history of thyroid cancer.  Occasionally can get some swelling in the right foot.    EMG overall was normal.  There was slightly low normal amplitude of the left tibial motor nerve most likely artifactual.  Blood work has been negative except for persistent elevated CK  which could be related to overactivity at work.  She did not do the needle study to rule out any muscle disease.  MAURY was positive.  Vitamin B6 was on the lower end and she is taking a multivitamin with no improvement.    Symptoms could be nonneurological in nature.  She is following with rheumatology to evaluate for autoimmune disease.  Some of her symptoms could be consistent with a lumbar radiculopathy.  She does have a history of herniated disc.  Does not want to do an MRI.  Could consider checking an x-ray of the lumbar spine.  She wants to hold off on this for right now.    Gabapentin was prescribed for pain management which caused somnolence.  Will try a low-dose.    I can see her back in 3 to 4 months    Return in about 4 months (around 3/13/2025) for In-Clinic Visit (must).    -     gabapentin (NEURONTIN) 100 MG capsule; Take 1 capsule (100 mg) by mouth 2 times daily.  -    Consider x-ray of the lumbar spine    Over 40 minutes were spent coordinating the care for the patient on the day of the encounter.  This includes previsit, during visit and post visit activities as documented above.  Counseling patient.  Reviewing chart.  Prescription management.  Multiple test reviewed.  Discussion about x-ray of the lumbar spine  (Activities include but not inclusive of reviewing chart, reviewing outside records, reviewing labs and imaging study results as well as the images, patient visit time including getting history and exam,  use if applicable, review of test results with the patient and coming up with a plan in a shared model, counseling patient and family, education and answering patient questions, EMR , EMR diagnosis entry and problem list management, medication reconciliation and prescription management if applicable, paperwork if applicable, printing documents and documentation of the visit activities.)        Bipin Miller MD  Neurologist  [facility] -  [place]  Tel:- [phone]    This note was dictated using voice recognition software.  Any grammatical or context distortions are unintentional and inherent to the software.      Again, thank you for allowing me to participate in the care of your patient.        Sincerely,        Bipin Miller MD

## 2024-11-13 NOTE — PROCEDURES
[facility]    [facility]ly [facility]  [facility], [address]  [place] [address]  Tel: [phone]  Fax: [phone]          Full Name: Chante Tracy Gender: Female  Patient ID: [id number] YOB: 1960      Visit Date: 11/13/2024 07:36  Age: 64 Years 1 Months Old  Interpreted By: Bipin Miller MD   Ref Dr.: Maria Esther Ho MD  Tech: ST   Height: 5 feet 3 inch  Reason for referral: Evaluate bilateral lowers. c/o weakness, muscle cramping, spasms in both legs > 1 year. Right = Left.       Motor NCS      Nerve / Sites Lat Amp Dist Derrick    ms mV cm m/s   R Peroneal - EDB      Ankle 4.27 3.5 8       Fib head 10.47 2.9 30 48      Pop fossa 12.92 2.3 11 45   L Peroneal - EDB      Ankle 3.80 2.6 8       Fib head 10.26 2.5 31.5 49      Pop fossa 12.60 2.6 11 47   R Tibial - AH      Ankle 4.11 5.8 8       Pop fossa 12.29 4.7 39 48   L Tibial - AH      Ankle 3.85 3.8 8       Pop fossa 11.77 3.0 37 47       F  Wave      Nerve Fmin    ms   R Peroneal - EDB 50.16   R Tibial - AH 49.58   L Peroneal - EDB 49.17   L Tibial - AH 50.26       Sensory NCS      Nerve / Sites Onset Lat Pk Lat Amp.2-3 Dist Derrick    ms ms  V cm m/s   R Sural - Ankle (Calf)      Calf 2.60 3.23 4.6 14 54   L Sural - Ankle (Calf)      Calf 2.66 3.49 4.7 14 53   R Superficial peroneal - Ankle      Lat leg 2.45 3.49 5.3 12 49   L Superficial peroneal - Ankle      Lat leg 2.45 3.07 3.7 12 49     Needle exam was not done.    SUMMARY  Nerve conduction and EMG study of bilateral lower extremities shows:    Normal right peroneal distal motor latency, amplitude and conduction velocity.  Normal left peroneal distal motor latency, amplitude and conduction velocity.  Normal right tibial distal motor latency, amplitude and conduction velocity.  Normal left tibial distal motor latency with low normal amplitude and conduction velocity.  Normal bilateral sural and superficial peroneal sensory  SNAP.  Monopolar needle exam was not done.      CLINICAL INTERPRETATION:  This is a normal nerve conduction study.  The EMG study was not done.  The low normal amplitude of the left tibial motor nerve conduction study is of unclear significance.  Further clinical correlation is needed.     Bipin Miller MD  Neurologist  [facility]  Tel:- [phone]

## 2024-11-13 NOTE — PROGRESS NOTES
NEUROLOGY OUTPATIENT PROGRESS NOTE   Nov 13, 2024     CHIEF COMPLAINT/REASON FOR VISIT/REASON FOR CONSULT  Patient presents with:  Results: EMG results    REASON FOR CONSULTATION-foot problems    REFERRAL SOURCE  Dr. Maria Esther Ho  CC Dr. Maria Esther Ho    HISTORY OF PRESENT ILLNESS  Chante Tracy is a 64 year old female seen today for evaluation of foot problems.  Symptoms have been going on for the last 6 months to year.  She reports that she will have episodes lasting for 1 day to several days where she will get some spasms in her left foot.  There will also be a rash in the left dorsal portion.  No itching no numbness.  Denies any back pain or any shooting pain down the legs.  There is occasionally some swelling of the right foot though symptoms/rash are mainly on the left side.  She does have a history of restless leg syndrome.  Has tried pramipexole and Flexeril.  Currently is wearing orthopedic shoes.  Does have a history of elevated CK of unclear etiology.  She is concerned about gout.    He has previously had a EMG and does not want to do the needle study.    11/13/24  Patient returns today.  Symptoms are about the same.  Standing makes the pain symptoms worse.  Reports about a rash on the foot as well.  Has seen rheumatology and no rheumatology could diagnosis been made.  Does have a history of a herniated disc at the L4 level.  Does report some muscle loss in both legs.  She tried the gabapentin though could not tolerate it.  Is on a multivitamin with B6.    Previous history is reviewed and this is unchanged.    PAST MEDICAL/SURGICAL HISTORY  No past medical history on file.  There is no problem list on file for this patient.  Second for vision loss, depression, cancer/leukemia, high blood pressure, high cholesterol    FAMILY HISTORY  Family History   Problem Relation Age of Onset    Breast Cancer No family hx of    Positive for restless leg syndrome in her mother and sister    SOCIAL HISTORY  Social  History     Tobacco Use    Smoking status: Former     Types: Cigarettes       SYSTEMS REVIEW  Twelve-system ROS was done and other than the HPI this was negative/positive for arm and leg pain, weakness/tingling, weakness/paralysis, difficulty walking, sleeping problems due to restless leg syndrome, depression, bloating, bowel problems, respiratory problems, skin changes, weight gain, snoring loudly when exhausted.    MEDICATIONS  Current Outpatient Medications   Medication Sig Dispense Refill    amitriptyline (ELAVIL) 10 MG tablet Take 10 mg by mouth At Bedtime      calcium citrate-vitamin D (CITRACAL) 200-6.25 MG-MCG TABS per tablet Take 1 tablet by mouth 2 times daily.      cholecalciferol (VITAMIN D3) 5000 units (125 mcg) capsule Take 5,000 Units by mouth daily      cyclobenzaprine (FLEXERIL) 10 MG tablet Take 10 mg by mouth 3 times daily as needed for muscle spasms      gabapentin (NEURONTIN) 100 MG capsule Take 1 capsule (100 mg) by mouth 2 times daily. 180 capsule 1    levothyroxine (SYNTHROID/LEVOTHROID) 200 MCG tablet Take 200 mcg by mouth daily      levothyroxine (SYNTHROID/LEVOTHROID) 25 MCG tablet Take 25 mcg by mouth daily      loperamide (IMODIUM A-D) 2 MG tablet Take 2 mg by mouth 4 times daily as needed for diarrhea      multivitamin w/minerals (MULTI-VITAMIN) tablet Take 1 tablet by mouth daily      omeprazole (PRILOSEC) 20 MG DR capsule Take 20 mg by mouth      pramipexole (MIRAPEX) 0.125 MG tablet Take 0.125 mg by mouth 2 times daily      pramipexole (MIRAPEX) 0.5 MG tablet Take 1 mg by mouth 2 times daily       salsalate (DISALCID) 500 MG tablet Take 1,000 mg by mouth 2 times daily      SPIRIVA RESPIMAT 2.5 MCG/ACT inhaler Inhale 2 puffs into the lungs daily      valsartan (DIOVAN) 160 MG tablet Take 1 tablet by mouth daily at 2 pm       No current facility-administered medications for this visit.        PHYSICAL EXAMINATION  VITALS: BP (!) 150/100 (BP Location: Right arm, Patient Position:  "Sitting)   Pulse 88   Ht 1.6 m (5' 3\")   Wt 107 kg (236 lb)   BMI 41.81 kg/m    GENERAL: Healthy appearing, alert, no acute distress, normal habitus.  CARDIOVASCULAR: Extremities warm and well perfused. Pulses present.   NEUROLOGICAL:  Patient is awake and oriented to self, place and time.  Attention span is normal.  Memory is grossly intact.  Language is fluent and follows commands appropriately.  Appropriate fund of knowledge. Cranial nerves 2-12 are intact. There is no pronator drift.  Motor exam shows 5/5 strength in all extremities.  Tone is symmetric bilaterally in upper and lower extremities.  Reflexes are symmetric and 1+ in upper extremities and lower extremities. Sensory exam is grossly intact to light touch, pin prick and vibration.  Finger to nose and heel to shin is without dysmetria.  Romberg is negative.  Gait is normal and the patient is able to do tandem walk and walk on toes and heels.  Exam similar to before.    DIAGNOSTICS  RELEVANT LABS  TSH 1.32  CMP noncontributory  TSH 4.76    OUTSIDE RECORDS  Outside referral notes and chart notes were reviewed and pertinent information has been summarized (in addition to the HPI):-    Labs    Component      Latest Ref [facility] 6/26/2024  11:40 AM   Albumin Fraction      3.7 - 5.1 g/dL 4.2    Alpha 1 Fraction      0.2 - 0.4 g/dL 0.3    Alpha 2 Fraction      0.5 - 0.9 g/dL 0.8    Beta Fraction      0.6 - 1.0 g/dL 0.9    Gamma Fraction      0.7 - 1.6 g/dL 1.0    Monoclonal Peak      <=0.0 g/dL 0.0    ELP Interpretation: Essentially normal electrophoretic pattern. No obvious monoclonal proteins seen. Pathologic significance requires clinical correlation. ALYSSIA Trimble M.D., Ph.D., Pathologist ().    Signout Location if Remote [facility]    Signout Location if Remote [facility]    Lyme Confirm IgG by CLIA Instrument Value      <0.90 Index 0.06    Lyme Confirm IgG by CLIA Blood      Nonreactive  Nonreactive    Lyme Confirm IgM by CLIA Instrument Value      <0.90 " Index 0.27    Lyme Confirm IgM by CLIA Blood      Nonreactive  Nonreactive    MAURY interpretation      Negative  Positive !    MAURY pattern 1 Homogeneous    MAURY titer 1 1:160    Immunofixation ELP No monoclonal protein seen on immunofixation. Pathologic significance requires clinical correlation.  ALYSSIA Trimble M.D., Ph.D., Pathologist ()    Vitamin B6      20.0 - 125.0 nmol/L 19.4 (L)    Vitamin B12      232 - 1,245 pg/mL 569    Vitamin B1 Whole Blood Level      70 - 180 nmol/L 117    Lyme Disease Antibodies Serum      <0.90  0.99 (H)    Copper      80.0 - 155.0 ug/dL 129.5    Uric Acid      2.4 - 5.7 mg/dL 4.3    Hemoglobin A1C      <5.7 % 5.4    CK Total      26 - 192 U/L 546 (H)    Total Protein Serum for ELP      6.4 - 8.3 g/dL 7.2       Legend:  ! Abnormal  (L) Low  (H) High    EMG  CLINICAL INTERPRETATION:  This is a normal nerve conduction study.  The EMG study was not done.  The low normal amplitude of the left tibial motor nerve conduction study is of unclear significance.  Further clinical correlation is needed.     IMPRESSION/REPORT/PLAN  Rash  Burning sensation of feet  Muscle spasms  Positive MAURY  Low B6  Elevated CK    This is a 64 year old female with episodes of left dorsal foot rash with burning pain and muscle spasms in the left foot.  These episodes last for about 1 day without any provoking factor.  No other previous autoimmune disease issues that does have a history of thyroid cancer.  Occasionally can get some swelling in the right foot.    EMG overall was normal.  There was slightly low normal amplitude of the left tibial motor nerve most likely artifactual.  Blood work has been negative except for persistent elevated CK which could be related to overactivity at work.  She did not do the needle study to rule out any muscle disease.  MAURY was positive.  Vitamin B6 was on the lower end and she is taking a multivitamin with no improvement.    Symptoms could be nonneurological in  nature.  She is following with rheumatology to evaluate for autoimmune disease.  Some of her symptoms could be consistent with a lumbar radiculopathy.  She does have a history of herniated disc.  Does not want to do an MRI.  Could consider checking an x-ray of the lumbar spine.  She wants to hold off on this for right now.    Gabapentin was prescribed for pain management which caused somnolence.  Will try a low-dose.    I can see her back in 3 to 4 months    Return in about 4 months (around 3/13/2025) for In-Clinic Visit (must).    -     gabapentin (NEURONTIN) 100 MG capsule; Take 1 capsule (100 mg) by mouth 2 times daily.  -    Consider x-ray of the lumbar spine    Over 40 minutes were spent coordinating the care for the patient on the day of the encounter.  This includes previsit, during visit and post visit activities as documented above.  Counseling patient.  Reviewing chart.  Prescription management.  Multiple test reviewed.  Discussion about x-ray of the lumbar spine  (Activities include but not inclusive of reviewing chart, reviewing outside records, reviewing labs and imaging study results as well as the images, patient visit time including getting history and exam,  use if applicable, review of test results with the patient and coming up with a plan in a shared model, counseling patient and family, education and answering patient questions, EMR , EMR diagnosis entry and problem list management, medication reconciliation and prescription management if applicable, paperwork if applicable, printing documents and documentation of the visit activities.)        Bipin Miller MD  Neurologist  [facility]  Tel:- [phone]    This note was dictated using voice recognition software.  Any grammatical or context distortions are unintentional and inherent to the software.

## 2024-11-27 ENCOUNTER — LAB REQUISITION (OUTPATIENT)
Dept: LAB | Facility: CLINIC | Age: 64
End: 2024-11-27
Payer: COMMERCIAL

## 2024-11-27 DIAGNOSIS — I10 ESSENTIAL (PRIMARY) HYPERTENSION: ICD-10-CM

## 2024-11-27 LAB
ANION GAP SERPL CALCULATED.3IONS-SCNC: 12 MMOL/L (ref 7–15)
BUN SERPL-MCNC: 18 MG/DL (ref 8–23)
CALCIUM SERPL-MCNC: 8.6 MG/DL (ref 8.8–10.4)
CHLORIDE SERPL-SCNC: 102 MMOL/L (ref 98–107)
CREAT SERPL-MCNC: 0.79 MG/DL (ref 0.51–0.95)
EGFRCR SERPLBLD CKD-EPI 2021: 84 ML/MIN/1.73M2
GLUCOSE SERPL-MCNC: 99 MG/DL (ref 70–99)
HCO3 SERPL-SCNC: 25 MMOL/L (ref 22–29)
POTASSIUM SERPL-SCNC: 4.3 MMOL/L (ref 3.4–5.3)
SODIUM SERPL-SCNC: 139 MMOL/L (ref 135–145)

## 2024-11-27 PROCEDURE — 80048 BASIC METABOLIC PNL TOTAL CA: CPT | Mod: ORL | Performed by: FAMILY MEDICINE

## 2025-01-30 ENCOUNTER — LAB (OUTPATIENT)
Dept: LAB | Facility: CLINIC | Age: 65
End: 2025-01-30
Payer: COMMERCIAL

## 2025-01-30 DIAGNOSIS — R76.8 ANA POSITIVE: ICD-10-CM

## 2025-01-30 DIAGNOSIS — M25.50 POLYARTHRALGIA: ICD-10-CM

## 2025-01-30 DIAGNOSIS — R74.8 ELEVATED CK: ICD-10-CM

## 2025-01-30 LAB
ALBUMIN SERPL BCG-MCNC: 4.2 G/DL (ref 3.5–5.2)
ALT SERPL W P-5'-P-CCNC: 27 U/L (ref 0–50)
BASOPHILS # BLD AUTO: 0 10E3/UL (ref 0–0.2)
BASOPHILS NFR BLD AUTO: 1 %
CK SERPL-CCNC: 349 U/L (ref 26–192)
CREAT SERPL-MCNC: 0.83 MG/DL (ref 0.51–0.95)
EGFRCR SERPLBLD CKD-EPI 2021: 79 ML/MIN/1.73M2
EOSINOPHIL # BLD AUTO: 0.2 10E3/UL (ref 0–0.7)
EOSINOPHIL NFR BLD AUTO: 3 %
ERYTHROCYTE [DISTWIDTH] IN BLOOD BY AUTOMATED COUNT: 13 % (ref 10–15)
HCT VFR BLD AUTO: 38.7 % (ref 35–47)
HGB BLD-MCNC: 12 G/DL (ref 11.7–15.7)
IMM GRANULOCYTES # BLD: 0 10E3/UL
IMM GRANULOCYTES NFR BLD: 0 %
LYMPHOCYTES # BLD AUTO: 1.3 10E3/UL (ref 0.8–5.3)
LYMPHOCYTES NFR BLD AUTO: 21 %
MCH RBC QN AUTO: 26.8 PG (ref 26.5–33)
MCHC RBC AUTO-ENTMCNC: 31 G/DL (ref 31.5–36.5)
MCV RBC AUTO: 86 FL (ref 78–100)
MONOCYTES # BLD AUTO: 0.3 10E3/UL (ref 0–1.3)
MONOCYTES NFR BLD AUTO: 5 %
NEUTROPHILS # BLD AUTO: 4.4 10E3/UL (ref 1.6–8.3)
NEUTROPHILS NFR BLD AUTO: 71 %
PLATELET # BLD AUTO: 234 10E3/UL (ref 150–450)
RBC # BLD AUTO: 4.48 10E6/UL (ref 3.8–5.2)
WBC # BLD AUTO: 6.2 10E3/UL (ref 4–11)

## 2025-02-02 LAB — DSDNA AB SER-ACNC: 1.7 IU/ML

## 2025-02-06 ENCOUNTER — LAB (OUTPATIENT)
Dept: LAB | Facility: CLINIC | Age: 65
End: 2025-02-06
Payer: COMMERCIAL

## 2025-02-06 ENCOUNTER — OFFICE VISIT (OUTPATIENT)
Dept: RHEUMATOLOGY | Facility: CLINIC | Age: 65
End: 2025-02-06
Payer: COMMERCIAL

## 2025-02-06 VITALS
WEIGHT: 240 LBS | OXYGEN SATURATION: 95 % | DIASTOLIC BLOOD PRESSURE: 85 MMHG | SYSTOLIC BLOOD PRESSURE: 136 MMHG | BODY MASS INDEX: 42.51 KG/M2 | HEART RATE: 99 BPM

## 2025-02-06 DIAGNOSIS — R76.8 ANA POSITIVE: ICD-10-CM

## 2025-02-06 DIAGNOSIS — M25.50 POLYARTHRALGIA: ICD-10-CM

## 2025-02-06 DIAGNOSIS — R74.8 ELEVATED CK: ICD-10-CM

## 2025-02-06 DIAGNOSIS — M25.50 POLYARTHRALGIA: Primary | ICD-10-CM

## 2025-02-06 LAB
CRP SERPL-MCNC: 5.96 MG/L
ERYTHROCYTE [SEDIMENTATION RATE] IN BLOOD BY WESTERGREN METHOD: 37 MM/HR (ref 0–30)
RHEUMATOID FACT SERPL-ACNC: <10 IU/ML
URATE SERPL-MCNC: 4.1 MG/DL (ref 2.4–5.7)

## 2025-02-06 RX ORDER — PREDNISONE 10 MG/1
10 TABLET ORAL DAILY
Qty: 21 TABLET | Refills: 0 | Status: SHIPPED | OUTPATIENT
Start: 2025-02-06 | End: 2025-02-27

## 2025-02-06 NOTE — PROGRESS NOTES
"      Rheumatology follow-up visit note     Chante is a 64 year old female presents today for follow-up.    Chante was seen today for recheck.    Diagnoses and all orders for this visit:    Polyarthralgia  -     XR Hand Bilateral G/E 3 Views; Future  -     Rheumatoid factor; Future  -     Uric acid; Future  -     Cyclic Citrullinated Peptide Antibody IgG; Future  -     Erythrocyte sedimentation rate auto; Future  -     CRP inflammation; Future  -     predniSONE (DELTASONE) 10 MG tablet; Take 1 tablet (10 mg) by mouth daily for 21 days.    MAURY positive    Elevated CK        This patient is here for follow-up of her evaluation of a positive MAURY, CK elevation since 2018.  She does not have evidence to suggest the active significant polymyositis/dermatomyositis or another connective tissue disease for which would recommend active treatment beyond monitoring at this time.  Today's focus from her perspective is more to do with arthralgias especially in her hands.  She had noted moderately severe pain.  This requires further looking into as noted.  Meanwhile I will ask her to take a small dose of prednisone as noted for the next 21 days and thereafter will meet her in person again.  The longitudinal plan of care for the diagnosis(es)/condition(s) as documented were addressed during this visit. Due to the added complexity in care, I will continue to support Chante in the subsequent management and with ongoing continuity of care.    Follow up in 2 months.    HPI    Chante Tracy is a 64 year old female is here for follow-up of  positive antinuclear antibody at 1: 160, With the homogenous pattern.  This was drawn as a part of workup of \"spasms, rash on her foot she has history of goiter status post thyroidectomy turned out to have papillary thyroid cancer.  Since her visit on August 1 her workup is back, she does not have hypocomplementemia, her dsDNA LYRIC's are normal.  She has had modest elevation of CK at 546, more recent " "CKs have dropped, as noted previously her CKs have been intermittently elevated as far back as 2018.  There is little to suggest weakness.  Her main concern today is discomfort in her joints especially the hands pointing to the PIPs and MCPs of both hands worse on the right side both activity as well as first thing in the morning she hurts more.       Following is the excerpt from a previous note:   And is due to have an EMG in the next few days.  She follows up with endocrine for this.  In this background when an MAURY was drawn in neurology it came back positive.  She reports no photosensitivity, facial eruption, mouth ulcers, pleuritic symptoms, DVT PE, history of seizure disorder, features suggestive of Raynaud's, hair loss, she has had 3 pregnancies 1 resulted in miscarriage #2 daughter who had birth defects, #3 was aborted.  She reports no features suggestive of Raynaud's.  She has not had kidney issues that she is aware of.  In terms of her joint pains she reports no pain swelling stiffness in any of her joint areas including upper and lower extremity appendicular system.  She has had some discomfort in her left foot where she has burning sensation.  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  She has longstanding intermittent elevated CK.  This dates back to almost 6 years to 2018.  These are fluctuating numbers.  She has not had features suggestive of weakness subjectively in the upper or lower extremities, swallowing difficulty or difficulty breathing besides her asthma.  She also carries a diagnosis of restless leg syndrome.  When she was seen at the neurology recently there were no motor deficits noted with motor exam showing 5 out of 5 strength in all extremities.  Her tone was felt to be symmetrical and reflexes intact and symmetrical.     DETAILED EXAMINATION  02/06/25  :    Vitals:    02/06/25 0958 02/06/25 1001   BP: (!) 150/81 " 136/85   BP Location: Right arm Left arm   Pulse: 99    SpO2: 95%    Weight: 108.9 kg (240 lb)      Alert oriented. Head including the face is examined for malar rash, heliotropes, scarring, lupus pernio. Eyes examined for redness such as in episcleritis/scleritis, periorbital lesions.   Neck/ Face examined for parotid gland swelling, range of motion of neck.  Left upper and lower and right upper and lower extremities examined for tenderness, swelling, warmth of the appendicular joints, range of motion, edema, rash.  Some of the important findings included: she does not have evidence of synovitis in the palpable joints of the upper extremities, however she is tender in multiple PIPs MCPs 2 and 3 especially the right hand..  No significant deformities of the digits.  + Heberden nodes.  Range of motion of the shoulders - show full abduction.  No JLT effusion or warmth of the knees.  she does not have dactylitis of the digits.     There are no active problems to display for this patient.    No past surgical history on file.   No past medical history on file.  Allergies   Allergen Reactions    Celecoxib Hives and Nausea and Vomiting    Hydrochlorothiazide W-Triamterene Hives     Nose bleeds    Indapamide Hives    Lisinopril Hives    Penicillins Other (See Comments) and Hives     Family hx    Simvastatin Hives    Bacitracin Other (See Comments)     Eats away skin    Belladonna GI Disturbance     Can't digest    Ethacrynic Acid Other (See Comments)     Slept all day    Fish Allergy GI Disturbance     Can't digest    Hydrochlorothiazide Other (See Comments)    Ibuprofen Other (See Comments)     Causes ulcers   Eats lining of stomach    Causes ulcers    Triamterene Hives     Nose bleeds    Budesonide-Formoterol Fumarate Rash    Diazepam Other (See Comments) and Palpitations     Heart racing    Menthol Rash     Icy Hot patch    Neomycin Rash    Neomycin-Bacitracin Zn-Polymyx Rash     Eats away skin    Polymyxin B Rash      Eats away skin    Sulfa Antibiotics Hives and Other (See Comments)     Family hx     Current Outpatient Medications   Medication Sig Dispense Refill    amitriptyline (ELAVIL) 10 MG tablet Take 10 mg by mouth At Bedtime      calcium citrate-vitamin D (CITRACAL) 200-6.25 MG-MCG TABS per tablet Take 1 tablet by mouth 2 times daily.      cholecalciferol (VITAMIN D3) 5000 units (125 mcg) capsule Take 5,000 Units by mouth daily      cyclobenzaprine (FLEXERIL) 10 MG tablet Take 10 mg by mouth 3 times daily as needed for muscle spasms      gabapentin (NEURONTIN) 100 MG capsule Take 1 capsule (100 mg) by mouth 2 times daily. 180 capsule 1    levothyroxine (SYNTHROID/LEVOTHROID) 200 MCG tablet Take 200 mcg by mouth daily      levothyroxine (SYNTHROID/LEVOTHROID) 25 MCG tablet Take 25 mcg by mouth daily      loperamide (IMODIUM A-D) 2 MG tablet Take 2 mg by mouth 4 times daily as needed for diarrhea      multivitamin w/minerals (MULTI-VITAMIN) tablet Take 1 tablet by mouth daily      omeprazole (PRILOSEC) 20 MG DR capsule Take 20 mg by mouth      pramipexole (MIRAPEX) 0.125 MG tablet Take 0.125 mg by mouth 2 times daily      pramipexole (MIRAPEX) 0.5 MG tablet Take 1 mg by mouth 2 times daily       salsalate (DISALCID) 500 MG tablet Take 1,000 mg by mouth 2 times daily      SPIRIVA RESPIMAT 2.5 MCG/ACT inhaler Inhale 2 puffs into the lungs daily      valsartan (DIOVAN) 160 MG tablet Take 1 tablet by mouth daily at 2 pm       family history is not on file.  Social Connections: Unknown (2/3/2023)    Received from [facility]    Social Connections     Frequency of Communication with Friends and Family: Not on file          WBC Count   Date Value Ref Range Status   01/30/2025 6.2 4.0 - 11.0 10e3/uL Final     RBC Count   Date Value Ref Range Status   01/30/2025 4.48 3.80 - 5.20 10e6/uL Final     Hemoglobin   Date Value Ref Range Status   01/30/2025 12.0 11.7 - 15.7 g/dL Final     Hematocrit   Date  Value Ref Range Status   01/30/2025 38.7 35.0 - 47.0 % Final     MCV   Date Value Ref Range Status   01/30/2025 86 78 - 100 fL Final     MCH   Date Value Ref Range Status   01/30/2025 26.8 26.5 - 33.0 pg Final     Platelet Count   Date Value Ref Range Status   01/30/2025 234 150 - 450 10e3/uL Final     % Lymphocytes   Date Value Ref Range Status   01/30/2025 21 % Final     AST   Date Value Ref Range Status   11/30/2022 25 10 - 35 U/L Final     ALT   Date Value Ref Range Status   01/30/2025 27 0 - 50 U/L Final     Albumin   Date Value Ref Range Status   01/30/2025 4.2 3.5 - 5.2 g/dL Final   11/12/2019 4.1 3.5 - 5.0 g/dL Final     Alkaline Phosphatase   Date Value Ref Range Status   11/30/2022 101 35 - 104 U/L Final     Creatinine   Date Value Ref Range Status   01/30/2025 0.83 0.51 - 0.95 mg/dL Final     GFR Estimate   Date Value Ref Range Status   01/30/2025 79 >60 mL/min/1.73m2 Final     Comment:     eGFR calculated using 2021 CKD-EPI equation.   11/03/2020 >60 >60 mL/min/1.73m2 Final     GFR Estimate If Black   Date Value Ref Range Status   11/03/2020 >60 >60 mL/min/1.73m2 Final

## 2025-02-27 ENCOUNTER — OFFICE VISIT (OUTPATIENT)
Dept: RHEUMATOLOGY | Facility: CLINIC | Age: 65
End: 2025-02-27
Payer: COMMERCIAL

## 2025-02-27 VITALS
SYSTOLIC BLOOD PRESSURE: 132 MMHG | WEIGHT: 243.8 LBS | BODY MASS INDEX: 43.19 KG/M2 | HEART RATE: 85 BPM | DIASTOLIC BLOOD PRESSURE: 76 MMHG | OXYGEN SATURATION: 98 %

## 2025-02-27 DIAGNOSIS — M25.50 POLYARTHRALGIA: ICD-10-CM

## 2025-02-27 DIAGNOSIS — M06.4 INFLAMMATORY POLYARTHRITIS (H): Primary | ICD-10-CM

## 2025-02-27 DIAGNOSIS — R76.8 ANA POSITIVE: ICD-10-CM

## 2025-02-27 DIAGNOSIS — R74.8 ELEVATED CK: ICD-10-CM

## 2025-02-27 DIAGNOSIS — Z85.850 HX OF THYROID CANCER: ICD-10-CM

## 2025-02-27 RX ORDER — METHOTREXATE 2.5 MG/1
10 TABLET ORAL
Qty: 16 TABLET | Refills: 0 | Status: SHIPPED | OUTPATIENT
Start: 2025-02-27 | End: 2025-03-29

## 2025-02-27 RX ORDER — PREDNISONE 5 MG/1
5 TABLET ORAL DAILY
Qty: 30 TABLET | Refills: 0 | Status: SHIPPED | OUTPATIENT
Start: 2025-02-27 | End: 2025-03-29

## 2025-02-27 RX ORDER — FOLIC ACID 1 MG/1
1 TABLET ORAL DAILY
Qty: 90 TABLET | Refills: 0 | Status: SHIPPED | OUTPATIENT
Start: 2025-02-27 | End: 2025-05-28

## 2025-02-27 NOTE — PROGRESS NOTES
"      Rheumatology follow-up visit note     Chante is a 64 year old female presents today for follow-up.    Chante was seen today for recheck.    Diagnoses and all orders for this visit:    Inflammatory polyarthritis (H)  -     methotrexate 2.5 MG tablet; Take 4 tablets (10 mg) by mouth every 7 days.  -     folic acid (FOLVITE) 1 MG tablet; Take 1 tablet (1 mg) by mouth daily.  -     predniSONE (DELTASONE) 5 MG tablet; Take 1 tablet (5 mg) by mouth daily.  -     ALT; Standing  -     Albumin level; Standing  -     Creatinine; Standing  -     CBC with platelets; Standing  -     CK total; Standing  -     CRP inflammation; Standing    MAURY positive  -     methotrexate 2.5 MG tablet; Take 4 tablets (10 mg) by mouth every 7 days.  -     folic acid (FOLVITE) 1 MG tablet; Take 1 tablet (1 mg) by mouth daily.  -     predniSONE (DELTASONE) 5 MG tablet; Take 1 tablet (5 mg) by mouth daily.    Polyarthralgia  -     ALT; Standing  -     Albumin level; Standing  -     Creatinine; Standing  -     CBC with platelets; Standing  -     CK total; Standing  -     CRP inflammation; Standing    Hx of thyroid cancer    Elevated CK        This patient turns out to have inflammatory joint disease secondary to seronegative rheumatoid arthritis except that she has a positive MAURY, going to start her on methotrexate folic acid continue low-dose prednisone, her CK has been intermittently elevated this continues to be monitored.  Will meet here in 4 weeks with labs prior.  Literature on these new medications provided.    Follow up in 1 month.    HPI    Chante Tracy is a 64 year old female is here for follow-up of   positive antinuclear antibody at 1: 160, With the homogenous pattern. This was drawn as a part of workup of \"spasms, rash on her foot she has history of goiter status post thyroidectomy turned out to have papillary thyroid cancer.  While there has been significant improvement in her joint pains after the dose of prednisone given the " "previous recent visit she continues to be troubled by \"muscle cramps\".  Her acute phase response was modestly elevated in keeping with the clinical findings of inflammatory joint disease in the hands especially more so on the right.  She felt that the prednisone that helped substantially the swelling and stiffness also improved.  However this has not helped her muscle cramps.         Following is the excerpt from a previous note:  - Since her visit on August 1 her workup is back, she does not have hypocomplementemia, her dsDNA LYRIC's are normal. She has had modest elevation of CK at 546, more recent CKs have dropped, as noted previously her CKs have been intermittently elevated as far back as 2018. There is little to suggest weakness. Her main concern today is discomfort in her joints especially the hands pointing to the PIPs and MCPs of both hands worse on the right side both activity as well as first thing in the morning she hurts more.         DETAILED EXAMINATION  02/27/25  :    Vitals:    02/27/25 0919   BP: 132/76   BP Location: Right arm   Patient Position: Sitting   Cuff Size: Adult Large   Pulse: 85   SpO2: 98%   Weight: 110.6 kg (243 lb 12.8 oz)     Alert oriented. Head including the face is examined for malar rash, heliotropes, scarring, lupus pernio. Eyes examined for redness such as in episcleritis/scleritis, periorbital lesions.   Neck/ Face examined for parotid gland swelling, range of motion of neck.  Left upper and lower and right upper and lower extremities examined for tenderness, swelling, warmth of the appendicular joints, range of motion, edema, rash.  Some of the important findings included: she does not have evidence of synovitis in the palpable joints of the upper extremities.  No significant deformities of the digits.  no Heberden nodes.  Range of motion of the shoulders  show full abduction.  No JLT effusion or warmth of the knees.  she does not have dactylitis of the digits.     There are " no active problems to display for this patient.    No past surgical history on file.   No past medical history on file.  Allergies   Allergen Reactions    Celecoxib Hives and Nausea and Vomiting    Hydrochlorothiazide W-Triamterene Hives     Nose bleeds    Indapamide Hives    Lisinopril Hives    Penicillins Other (See Comments) and Hives     Family hx    Simvastatin Hives    Bacitracin Other (See Comments)     Eats away skin    Belladonna GI Disturbance     Can't digest    Ethacrynic Acid Other (See Comments)     Slept all day    Fish Allergy GI Disturbance     Can't digest    Hydrochlorothiazide Other (See Comments)    Ibuprofen Other (See Comments)     Causes ulcers   Eats lining of stomach    Causes ulcers    Triamterene Hives     Nose bleeds    Budesonide-Formoterol Fumarate Rash    Diazepam Other (See Comments) and Palpitations     Heart racing    Menthol Rash     Icy Hot patch    Neomycin Rash    Neomycin-Bacitracin Zn-Polymyx Rash     Eats away skin    Polymyxin B Rash     Eats away skin    Sulfa Antibiotics Hives and Other (See Comments)     Family hx     Current Outpatient Medications   Medication Sig Dispense Refill    amitriptyline (ELAVIL) 10 MG tablet Take 10 mg by mouth At Bedtime      calcium citrate-vitamin D (CITRACAL) 200-6.25 MG-MCG TABS per tablet Take 1 tablet by mouth 2 times daily.      cholecalciferol (VITAMIN D3) 5000 units (125 mcg) capsule Take 5,000 Units by mouth daily      cyclobenzaprine (FLEXERIL) 10 MG tablet Take 10 mg by mouth 3 times daily as needed for muscle spasms      levothyroxine (SYNTHROID/LEVOTHROID) 200 MCG tablet Take 200 mcg by mouth daily      levothyroxine (SYNTHROID/LEVOTHROID) 25 MCG tablet Take 25 mcg by mouth daily (Patient not taking: Reported on 2/6/2025)      loperamide (IMODIUM A-D) 2 MG tablet Take 2 mg by mouth 4 times daily as needed for diarrhea      multivitamin w/minerals (MULTI-VITAMIN) tablet Take 1 tablet by mouth daily      omeprazole (PRILOSEC) 20  MG DR capsule Take 20 mg by mouth      pramipexole (MIRAPEX) 0.125 MG tablet Take 0.125 mg by mouth 2 times daily      pramipexole (MIRAPEX) 0.5 MG tablet Take 1 mg by mouth 2 times daily       predniSONE (DELTASONE) 10 MG tablet Take 1 tablet (10 mg) by mouth daily for 21 days. 21 tablet 0    salsalate (DISALCID) 500 MG tablet Take 1,000 mg by mouth 2 times daily      SPIRIVA RESPIMAT 2.5 MCG/ACT inhaler Inhale 2 puffs into the lungs daily      valsartan (DIOVAN) 160 MG tablet Take 1 tablet by mouth daily at 2 pm       family history is not on file.  Social Connections: Unknown (2/3/2023)    Received from [facility]    Social Connections     Frequency of Communication with Friends and Family: Not on file          WBC Count   Date Value Ref Range Status   01/30/2025 6.2 4.0 - 11.0 10e3/uL Final     RBC Count   Date Value Ref Range Status   01/30/2025 4.48 3.80 - 5.20 10e6/uL Final     Hemoglobin   Date Value Ref Range Status   01/30/2025 12.0 11.7 - 15.7 g/dL Final     Hematocrit   Date Value Ref Range Status   01/30/2025 38.7 35.0 - 47.0 % Final     MCV   Date Value Ref Range Status   01/30/2025 86 78 - 100 fL Final     MCH   Date Value Ref Range Status   01/30/2025 26.8 26.5 - 33.0 pg Final     Platelet Count   Date Value Ref Range Status   01/30/2025 234 150 - 450 10e3/uL Final     % Lymphocytes   Date Value Ref Range Status   01/30/2025 21 % Final     AST   Date Value Ref Range Status   11/30/2022 25 10 - 35 U/L Final     ALT   Date Value Ref Range Status   01/30/2025 27 0 - 50 U/L Final     Albumin   Date Value Ref Range Status   01/30/2025 4.2 3.5 - 5.2 g/dL Final   11/12/2019 4.1 3.5 - 5.0 g/dL Final     Alkaline Phosphatase   Date Value Ref Range Status   11/30/2022 101 35 - 104 U/L Final     Creatinine   Date Value Ref Range Status   01/30/2025 0.83 0.51 - 0.95 mg/dL Final     GFR Estimate   Date Value Ref Range Status   01/30/2025 79 >60 mL/min/1.73m2 Final     Comment:      eGFR calculated using 2021 CKD-EPI equation.   11/03/2020 >60 >60 mL/min/1.73m2 Final     GFR Estimate If Black   Date Value Ref Range Status   11/03/2020 >60 >60 mL/min/1.73m2 Final     Erythrocyte Sedimentation Rate   Date Value Ref Range Status   02/06/2025 37 (H) 0 - 30 mm/hr Final

## 2025-03-24 ENCOUNTER — LAB (OUTPATIENT)
Dept: LAB | Facility: CLINIC | Age: 65
End: 2025-03-24
Payer: COMMERCIAL

## 2025-03-24 DIAGNOSIS — M25.50 POLYARTHRALGIA: ICD-10-CM

## 2025-03-24 DIAGNOSIS — M06.4 INFLAMMATORY POLYARTHRITIS (H): ICD-10-CM

## 2025-03-24 LAB
ALBUMIN SERPL BCG-MCNC: 4.3 G/DL (ref 3.5–5.2)
ALT SERPL W P-5'-P-CCNC: 30 U/L (ref 0–50)
CK SERPL-CCNC: 324 U/L (ref 26–192)
CREAT SERPL-MCNC: 0.89 MG/DL (ref 0.51–0.95)
CRP SERPL-MCNC: 6.6 MG/L
EGFRCR SERPLBLD CKD-EPI 2021: 73 ML/MIN/1.73M2
ERYTHROCYTE [DISTWIDTH] IN BLOOD BY AUTOMATED COUNT: 13.6 % (ref 10–15)
HCT VFR BLD AUTO: 40.1 % (ref 35–47)
HGB BLD-MCNC: 12.2 G/DL (ref 11.7–15.7)
MCH RBC QN AUTO: 27.6 PG (ref 26.5–33)
MCHC RBC AUTO-ENTMCNC: 30.4 G/DL (ref 31.5–36.5)
MCV RBC AUTO: 91 FL (ref 78–100)
PLATELET # BLD AUTO: 287 10E3/UL (ref 150–450)
RBC # BLD AUTO: 4.42 10E6/UL (ref 3.8–5.2)
WBC # BLD AUTO: 8 10E3/UL (ref 4–11)

## 2025-03-24 PROCEDURE — 82565 ASSAY OF CREATININE: CPT

## 2025-03-24 PROCEDURE — 85027 COMPLETE CBC AUTOMATED: CPT

## 2025-03-24 PROCEDURE — 82550 ASSAY OF CK (CPK): CPT

## 2025-03-24 PROCEDURE — 82040 ASSAY OF SERUM ALBUMIN: CPT

## 2025-03-24 PROCEDURE — 36415 COLL VENOUS BLD VENIPUNCTURE: CPT

## 2025-03-24 PROCEDURE — 86140 C-REACTIVE PROTEIN: CPT

## 2025-03-24 PROCEDURE — 84460 ALANINE AMINO (ALT) (SGPT): CPT

## 2025-03-27 ENCOUNTER — OFFICE VISIT (OUTPATIENT)
Dept: RHEUMATOLOGY | Facility: CLINIC | Age: 65
End: 2025-03-27
Payer: COMMERCIAL

## 2025-03-27 VITALS
BODY MASS INDEX: 43.17 KG/M2 | WEIGHT: 243.7 LBS | OXYGEN SATURATION: 96 % | SYSTOLIC BLOOD PRESSURE: 161 MMHG | HEART RATE: 100 BPM | DIASTOLIC BLOOD PRESSURE: 80 MMHG

## 2025-03-27 DIAGNOSIS — Z85.850 HX OF THYROID CANCER: ICD-10-CM

## 2025-03-27 DIAGNOSIS — Z79.899 HIGH RISK MEDICATION USE: ICD-10-CM

## 2025-03-27 DIAGNOSIS — M06.4 INFLAMMATORY POLYARTHRITIS (H): Primary | ICD-10-CM

## 2025-03-27 DIAGNOSIS — R74.8 ELEVATED CK: ICD-10-CM

## 2025-03-27 DIAGNOSIS — R76.8 ANA POSITIVE: ICD-10-CM

## 2025-03-27 DIAGNOSIS — M25.50 POLYARTHRALGIA: ICD-10-CM

## 2025-03-27 RX ORDER — PREDNISONE 5 MG/1
5 TABLET ORAL DAILY
Qty: 30 TABLET | Refills: 0 | Status: SHIPPED | OUTPATIENT
Start: 2025-03-27

## 2025-03-27 RX ORDER — METHOTREXATE 2.5 MG/1
20 TABLET ORAL
Qty: 32 TABLET | Refills: 0 | Status: SHIPPED | OUTPATIENT
Start: 2025-03-27 | End: 2025-04-26

## 2025-03-27 NOTE — PROGRESS NOTES
"      Rheumatology follow-up visit note     Chante is a 64 year old female presents today for follow-up.    Chante was seen today for recheck.    Diagnoses and all orders for this visit:    Inflammatory polyarthritis (H)  -     methotrexate 2.5 MG tablet; Take 8 tablets (20 mg) by mouth every 7 days.  -     predniSONE (DELTASONE) 5 MG tablet; Take 1 tablet (5 mg) by mouth daily.    MAURY positive  -     methotrexate 2.5 MG tablet; Take 8 tablets (20 mg) by mouth every 7 days.  -     predniSONE (DELTASONE) 5 MG tablet; Take 1 tablet (5 mg) by mouth daily.    Polyarthralgia    Elevated CK    Hx of thyroid cancer    High risk medication use        The longitudinal plan of care for the diagnosis(es)/condition(s) as documented were addressed during this visit. Due to the added complexity in care, I will continue to support Chante in the subsequent management and with ongoing continuity of care.      Follow up in 1 month.  Labs prior.    HPI    Chante Tracy is a 64 year old female is here for follow-up of inflammatory polyarthritis affecting her hands, in the background of positive antinuclear antibody at 1: 160, With the homogenous pattern.  While she has tolerated methotrexate at 10 mg with the normal liver and kidney as well as hemogram as she feels that the pain in the hands has gotten worse.  She has been on prednisone 5 mg during this time.  Previously the prednisone had been more helpful.  However as we discussed more of this pain it turns out that this is more of a \"numbness\" type pain which is reminiscent of carpal tunnel syndrome which she was diagnosed several years ago and had release surgery done which she thinks was not done properly and is not interested in surgery for the right hand.  She has more than 1 types of braces that she wears off and on.  She has been dropping things such as her cell phone.  This is from the right hand.  This is not waking her up from sleep.  Her CK that has remained elevated for " "as far as she can recall 25 years, most recent record that is available here is from November 2018 remains at the same range as it has been over the past 7 years or so.    Following is the excerpt from a previous note:    This was drawn as a part of workup of \"spasms, rash on her foot she has history of goiter status post thyroidectomy turned out to have papillary thyroid cancer.  While there has been significant improvement in her joint pains after the dose of prednisone given the previous recent visit she continues to be troubled by \"muscle cramps\".  Her acute phase response was modestly elevated in keeping with the clinical findings of inflammatory joint disease in the hands especially more so on the right.  She felt that the prednisone that helped substantially the swelling and stiffness also improved.  However this has not helped her muscle cramps.     DETAILED EXAMINATION  03/27/25  :    Vitals:    03/27/25 1020 03/27/25 1022   BP: (!) 150/84 (!) 161/80   BP Location: Right arm Left arm   Pulse: 100    SpO2: 96%    Weight: 110.5 kg (243 lb 11.2 oz)      Alert oriented. Head including the face is examined for malar rash, heliotropes, scarring, lupus pernio. Eyes examined for redness such as in episcleritis/scleritis, periorbital lesions.   Neck/ Face examined for parotid gland swelling, range of motion of neck.  Left upper and lower and right upper and lower extremities examined for tenderness, swelling, warmth of the appendicular joints, range of motion, edema, rash.  Some of the important findings included: she does not have evidence of synovitis in the palpable joints of the upper extremities, in contrast to her recent visit there is little to no tenderness in the metacarpophalangeal or proximal interphalangeal joints.  She has a positive Tinel's on the right wrist volar aspect not so on the left corresponding side..  No significant deformities of the digits.  NO Heberden nodes.  Range of motion of the " shoulders   show full abduction.  No JLT effusion or warmth of the knees.  she does not have dactylitis of the digits.     There are no active problems to display for this patient.    No past surgical history on file.   No past medical history on file.  Allergies   Allergen Reactions    Celecoxib Hives and Nausea and Vomiting    Hydrochlorothiazide W-Triamterene Hives     Nose bleeds    Indapamide Hives    Lisinopril Hives    Penicillins Other (See Comments) and Hives     Family hx    Simvastatin Hives    Bacitracin Other (See Comments)     Eats away skin    Belladonna GI Disturbance     Can't digest    Ethacrynic Acid Other (See Comments)     Slept all day    Fish Allergy GI Disturbance     Can't digest    Hydrochlorothiazide Other (See Comments)    Ibuprofen Other (See Comments)     Causes ulcers   Eats lining of stomach    Causes ulcers    Triamterene Hives     Nose bleeds    Budesonide-Formoterol Fumarate Rash    Diazepam Other (See Comments) and Palpitations     Heart racing    Menthol Rash     Icy Hot patch    Neomycin Rash    Neomycin-Bacitracin Zn-Polymyx Rash     Eats away skin    Polymyxin B Rash     Eats away skin    Sulfa Antibiotics Hives and Other (See Comments)     Family hx     Current Outpatient Medications   Medication Sig Dispense Refill    amitriptyline (ELAVIL) 10 MG tablet Take 10 mg by mouth At Bedtime      calcium citrate-vitamin D (CITRACAL) 200-6.25 MG-MCG TABS per tablet Take 1 tablet by mouth 2 times daily.      cholecalciferol (VITAMIN D3) 5000 units (125 mcg) capsule Take 5,000 Units by mouth daily      cyclobenzaprine (FLEXERIL) 10 MG tablet Take 10 mg by mouth 3 times daily as needed for muscle spasms      folic acid (FOLVITE) 1 MG tablet Take 1 tablet (1 mg) by mouth daily. 90 tablet 0    levothyroxine (SYNTHROID/LEVOTHROID) 200 MCG tablet Take 200 mcg by mouth daily      levothyroxine (SYNTHROID/LEVOTHROID) 25 MCG tablet Take 25 mcg by mouth daily.      loperamide (IMODIUM A-D) 2 MG  tablet Take 2 mg by mouth 4 times daily as needed for diarrhea      methotrexate 2.5 MG tablet Take 4 tablets (10 mg) by mouth every 7 days. 16 tablet 0    multivitamin w/minerals (MULTI-VITAMIN) tablet Take 1 tablet by mouth daily      omeprazole (PRILOSEC) 20 MG DR capsule Take 20 mg by mouth      pramipexole (MIRAPEX) 0.125 MG tablet Take 0.125 mg by mouth 2 times daily      pramipexole (MIRAPEX) 0.5 MG tablet Take 1 mg by mouth 2 times daily       predniSONE (DELTASONE) 5 MG tablet Take 1 tablet (5 mg) by mouth daily. 30 tablet 0    salsalate (DISALCID) 500 MG tablet Take 1,000 mg by mouth 2 times daily      SPIRIVA RESPIMAT 2.5 MCG/ACT inhaler Inhale 2 puffs into the lungs daily      valsartan (DIOVAN) 160 MG tablet Take 1 tablet by mouth daily at 2 pm       family history is not on file.  Social Connections: Unknown (2/3/2023)    Received from [facility]    Social Connections     Frequency of Communication with Friends and Family: Not on file          WBC Count   Date Value Ref Range Status   03/24/2025 8.0 4.0 - 11.0 10e3/uL Final     RBC Count   Date Value Ref Range Status   03/24/2025 4.42 3.80 - 5.20 10e6/uL Final     Hemoglobin   Date Value Ref Range Status   03/24/2025 12.2 11.7 - 15.7 g/dL Final     Hematocrit   Date Value Ref Range Status   03/24/2025 40.1 35.0 - 47.0 % Final     MCV   Date Value Ref Range Status   03/24/2025 91 78 - 100 fL Final     MCH   Date Value Ref Range Status   03/24/2025 27.6 26.5 - 33.0 pg Final     Platelet Count   Date Value Ref Range Status   03/24/2025 287 150 - 450 10e3/uL Final     % Lymphocytes   Date Value Ref Range Status   01/30/2025 21 % Final     AST   Date Value Ref Range Status   11/30/2022 25 10 - 35 U/L Final     ALT   Date Value Ref Range Status   03/24/2025 30 0 - 50 U/L Final     Albumin   Date Value Ref Range Status   03/24/2025 4.3 3.5 - 5.2 g/dL Final   11/12/2019 4.1 3.5 - 5.0 g/dL Final     Alkaline Phosphatase   Date  Value Ref Range Status   11/30/2022 101 35 - 104 U/L Final     Creatinine   Date Value Ref Range Status   03/24/2025 0.89 0.51 - 0.95 mg/dL Final     GFR Estimate   Date Value Ref Range Status   03/24/2025 73 >60 mL/min/1.73m2 Final     Comment:     eGFR calculated using 2021 CKD-EPI equation.   11/03/2020 >60 >60 mL/min/1.73m2 Final     GFR Estimate If Black   Date Value Ref Range Status   11/03/2020 >60 >60 mL/min/1.73m2 Final     Erythrocyte Sedimentation Rate   Date Value Ref Range Status   02/06/2025 37 (H) 0 - 30 mm/hr Final

## 2025-04-02 ENCOUNTER — OFFICE VISIT (OUTPATIENT)
Dept: NEUROLOGY | Facility: CLINIC | Age: 65
End: 2025-04-02
Payer: COMMERCIAL

## 2025-04-02 ENCOUNTER — LAB (OUTPATIENT)
Dept: LAB | Facility: HOSPITAL | Age: 65
End: 2025-04-02
Payer: COMMERCIAL

## 2025-04-02 VITALS
SYSTOLIC BLOOD PRESSURE: 154 MMHG | HEIGHT: 63 IN | HEART RATE: 92 BPM | DIASTOLIC BLOOD PRESSURE: 102 MMHG | BODY MASS INDEX: 43.85 KG/M2 | WEIGHT: 247.5 LBS

## 2025-04-02 DIAGNOSIS — R74.8 ELEVATED CK: Primary | ICD-10-CM

## 2025-04-02 DIAGNOSIS — M62.838 MUSCLE SPASM: ICD-10-CM

## 2025-04-02 LAB
ALBUMIN SERPL BCG-MCNC: 4.2 G/DL (ref 3.5–5.2)
ALP SERPL-CCNC: 112 U/L (ref 40–150)
ALT SERPL W P-5'-P-CCNC: 30 U/L (ref 0–50)
ANION GAP SERPL CALCULATED.3IONS-SCNC: 7 MMOL/L (ref 7–15)
AST SERPL W P-5'-P-CCNC: 27 U/L (ref 0–45)
BILIRUB SERPL-MCNC: 0.2 MG/DL
BUN SERPL-MCNC: 21.5 MG/DL (ref 8–23)
CALCIUM SERPL-MCNC: 8.5 MG/DL (ref 8.8–10.4)
CHLORIDE SERPL-SCNC: 103 MMOL/L (ref 98–107)
CK SERPL-CCNC: 373 U/L (ref 26–192)
CREAT SERPL-MCNC: 0.87 MG/DL (ref 0.51–0.95)
EGFRCR SERPLBLD CKD-EPI 2021: 75 ML/MIN/1.73M2
GLUCOSE SERPL-MCNC: 110 MG/DL (ref 70–99)
HCO3 SERPL-SCNC: 32 MMOL/L (ref 22–29)
MAGNESIUM SERPL-MCNC: 2.3 MG/DL (ref 1.7–2.3)
POTASSIUM SERPL-SCNC: 4.4 MMOL/L (ref 3.4–5.3)
PROT SERPL-MCNC: 7.4 G/DL (ref 6.4–8.3)
SODIUM SERPL-SCNC: 142 MMOL/L (ref 135–145)

## 2025-04-02 PROCEDURE — 3077F SYST BP >= 140 MM HG: CPT | Performed by: PSYCHIATRY & NEUROLOGY

## 2025-04-02 PROCEDURE — 99214 OFFICE O/P EST MOD 30 MIN: CPT | Performed by: PSYCHIATRY & NEUROLOGY

## 2025-04-02 PROCEDURE — 82310 ASSAY OF CALCIUM: CPT

## 2025-04-02 PROCEDURE — 84155 ASSAY OF PROTEIN SERUM: CPT

## 2025-04-02 PROCEDURE — 83735 ASSAY OF MAGNESIUM: CPT

## 2025-04-02 PROCEDURE — 80053 COMPREHEN METABOLIC PANEL: CPT

## 2025-04-02 PROCEDURE — G2211 COMPLEX E/M VISIT ADD ON: HCPCS | Performed by: PSYCHIATRY & NEUROLOGY

## 2025-04-02 PROCEDURE — 3080F DIAST BP >= 90 MM HG: CPT | Performed by: PSYCHIATRY & NEUROLOGY

## 2025-04-02 PROCEDURE — 82550 ASSAY OF CK (CPK): CPT

## 2025-04-02 PROCEDURE — 36415 COLL VENOUS BLD VENIPUNCTURE: CPT

## 2025-04-02 RX ORDER — TIZANIDINE 2 MG/1
2 TABLET ORAL 3 TIMES DAILY PRN
Qty: 270 TABLET | Refills: 1 | Status: SHIPPED | OUTPATIENT
Start: 2025-04-02

## 2025-04-02 NOTE — NURSING NOTE
Chief Complaint   Patient presents with    Burning sensation feet     Patient states she had some burning and pain on both feet yesterday. She stated she took herself off her medication salsalate. Follow up     Francia Wilson on 4/2/2025 at 8:00 AM

## 2025-04-02 NOTE — LETTER
4/2/2025      Chante Tracy  [address]  [facility] [address]      Dear Colleague,    Thank you for referring your patient, Chante Tracy, to the [facility] [place]. Please see a copy of my visit note below.    NEUROLOGY OUTPATIENT PROGRESS NOTE   Apr 2, 2025     CHIEF COMPLAINT/REASON FOR VISIT/REASON FOR CONSULT  Patient presents with:  Burning sensation feet: Patient states she had some burning and pain on both feet yesterday. She stated she took herself off her medication salsalate. Follow up    REASON FOR CONSULTATION-foot problems    REFERRAL SOURCE  Dr. Maria Esther Ho  CC Dr. Maria Esther Ho    HISTORY OF PRESENT ILLNESS  Chante Tracy is a 64 year old female seen today for evaluation of foot problems.  Symptoms have been going on for the last 6 months to year.  She reports that she will have episodes lasting for 1 day to several days where she will get some spasms in her left foot.  There will also be a rash in the left dorsal portion.  No itching no numbness.  Denies any back pain or any shooting pain down the legs.  There is occasionally some swelling of the right foot though symptoms/rash are mainly on the left side.  She does have a history of restless leg syndrome.  Has tried pramipexole and Flexeril.  Currently is wearing orthopedic shoes.  Does have a history of elevated CK of unclear etiology.  She is concerned about gout.    He has previously had a EMG and does not want to do the needle study.    11/13/24  Patient returns today.  Symptoms are about the same.  Standing makes the pain symptoms worse.  Reports about a rash on the foot as well.  Has seen rheumatology and no rheumatology could diagnosis been made.  Does have a history of a herniated disc at the L4 level.  Does report some muscle loss in both legs.  She tried the gabapentin though could not tolerate it.  Is on a multivitamin with B6.    4/2/25  Patient returns today  1.  She had stopped the  CellCept that resolved the rash as well as the pain in the feet that she was having  2.  Continues to have muscle spasms in her legs.  This is both legs.  Does have some spasms in her mid chest as well.  Does drink plenty of water.  3.  Has been diagnosed to have carpal tunnel in her hands by rheumatology.  Also has rheumatoid arthritis in her hands.  Has been using carpal tunnel braces which might be helping.  Does not want to do surgery.  There are some plans to do steroid injections  No new concerns    Previous history is reviewed and this is unchanged.    PAST MEDICAL/SURGICAL HISTORY  No past medical history on file.  There is no problem list on file for this patient.  Second for vision loss, depression, cancer/leukemia, high blood pressure, high cholesterol    FAMILY HISTORY  Family History   Problem Relation Age of Onset     Breast Cancer No family hx of    Positive for restless leg syndrome in her mother and sister    SOCIAL HISTORY  Social History     Tobacco Use     Smoking status: Former     Types: Cigarettes       SYSTEMS REVIEW  Twelve-system ROS was done and other than the HPI this was negative/positive for arm and leg pain, weakness/tingling, weakness/paralysis, difficulty walking, sleeping problems due to restless leg syndrome, depression, bloating, bowel problems, respiratory problems, skin changes, weight gain, snoring loudly when exhausted.    MEDICATIONS  Current Outpatient Medications   Medication Sig Dispense Refill     amitriptyline (ELAVIL) 10 MG tablet Take 10 mg by mouth At Bedtime       calcium citrate-vitamin D (CITRACAL) 200-6.25 MG-MCG TABS per tablet Take 1 tablet by mouth 2 times daily.       cholecalciferol (VITAMIN D3) 5000 units (125 mcg) capsule Take 5,000 Units by mouth daily       cyclobenzaprine (FLEXERIL) 10 MG tablet Take 10 mg by mouth 3 times daily as needed for muscle spasms       folic acid (FOLVITE) 1 MG tablet Take 1 tablet (1 mg) by mouth daily. 90 tablet 0      "levothyroxine (SYNTHROID/LEVOTHROID) 200 MCG tablet Take 200 mcg by mouth daily       levothyroxine (SYNTHROID/LEVOTHROID) 25 MCG tablet Take 25 mcg by mouth daily.       loperamide (IMODIUM A-D) 2 MG tablet Take 2 mg by mouth 4 times daily as needed for diarrhea       methotrexate 2.5 MG tablet Take 8 tablets (20 mg) by mouth every 7 days. 32 tablet 0     multivitamin w/minerals (MULTI-VITAMIN) tablet Take 1 tablet by mouth daily       omeprazole (PRILOSEC) 20 MG DR capsule Take 20 mg by mouth       pramipexole (MIRAPEX) 0.125 MG tablet Take 0.125 mg by mouth 2 times daily       pramipexole (MIRAPEX) 0.5 MG tablet Take 1 mg by mouth 2 times daily        predniSONE (DELTASONE) 5 MG tablet Take 1 tablet (5 mg) by mouth daily. 30 tablet 0     SPIRIVA RESPIMAT 2.5 MCG/ACT inhaler Inhale 2 puffs into the lungs daily       tiZANidine (ZANAFLEX) 2 MG tablet Take 1 tablet (2 mg) by mouth 3 times daily as needed for muscle spasms. 270 tablet 1     valsartan (DIOVAN) 160 MG tablet Take 1 tablet by mouth daily at 2 pm       salsalate (DISALCID) 500 MG tablet Take 1,000 mg by mouth 2 times daily (Patient not taking: Reported on 3/27/2025)       No current facility-administered medications for this visit.        PHYSICAL EXAMINATION  VITALS: BP (!) 154/102   Pulse 92   Ht 1.6 m (5' 3\")   Wt 112.3 kg (247 lb 8 oz)   BMI 43.84 kg/m    GENERAL: Healthy appearing, alert, no acute distress, normal habitus.  CARDIOVASCULAR: Extremities warm and well perfused. Pulses present.   NEUROLOGICAL:  Patient is awake and oriented to self, place and time.  Attention span is normal.  Memory is grossly intact.  Language is fluent and follows commands appropriately.  Appropriate fund of knowledge. Cranial nerves 2-12 are intact. There is no pronator drift.  Motor exam shows 5/5 strength in all extremities.  Tone is symmetric bilaterally in upper and lower extremities.  Reflexes are symmetric and 1+ in upper extremities and lower extremities. " Sensory exam is grossly intact to light touch, pin prick and vibration.  Finger to nose and heel to shin is without dysmetria.  Romberg is negative.  Gait is normal and the patient is able to do tandem walk and walk on toes and heels.  Exam stable.    DIAGNOSTICS  RELEVANT LABS  TSH 1.32  CMP noncontributory  TSH 4.76    OUTSIDE RECORDS  Outside referral notes and chart notes were reviewed and pertinent information has been summarized (in addition to the HPI):-    Labs    Component      Latest Ref Rng 6/26/2024  11:40 AM   Albumin Fraction      3.7 - 5.1 g/dL 4.2    Alpha 1 Fraction      0.2 - 0.4 g/dL 0.3    Alpha 2 Fraction      0.5 - 0.9 g/dL 0.8    Beta Fraction      0.6 - 1.0 g/dL 0.9    Gamma Fraction      0.7 - 1.6 g/dL 1.0    Monoclonal Peak      <=0.0 g/dL 0.0    ELP Interpretation: Essentially normal electrophoretic pattern. No obvious monoclonal proteins seen. Pathologic significance requires clinical correlation. ALYSSIA Trimble M.D., Ph.D., Pathologist ().    Signout Location if Remote [facility]    Signout Location if Remote [facility]    Lyme Confirm IgG by CLIA Instrument Value      <0.90 Index 0.06    Lyme Confirm IgG by CLIA Blood      Nonreactive  Nonreactive    Lyme Confirm IgM by CLIA Instrument Value      <0.90 Index 0.27    Lyme Confirm IgM by CLIA Blood      Nonreactive  Nonreactive    MAURY interpretation      Negative  Positive !    MAURY pattern 1 Homogeneous    MAURY titer 1 1:160    Immunofixation ELP No monoclonal protein seen on immunofixation. Pathologic significance requires clinical correlation.  ALYSSIA Trimble M.D., Ph.D., Pathologist ()    Vitamin B6      20.0 - 125.0 nmol/L 19.4 (L)    Vitamin B12      232 - 1,245 pg/mL 569    Vitamin B1 Whole Blood Level      70 - 180 nmol/L 117    Lyme Disease Antibodies Serum      <0.90  0.99 (H)    Copper      80.0 - 155.0 ug/dL 129.5    Uric Acid      2.4 - 5.7 mg/dL 4.3    Hemoglobin A1C      <5.7 % 5.4    CK Total      26 - 192 U/L  546 (H)    Total Protein Serum for ELP      6.4 - 8.3 g/dL 7.2       Legend:  ! Abnormal  (L) Low  (H) High    EMG  CLINICAL INTERPRETATION:  This is a normal nerve conduction study.  The EMG study was not done.  The low normal amplitude of the left tibial motor nerve conduction study is of unclear significance.  Further clinical correlation is needed.     IMPRESSION/REPORT/PLAN  Rash-resolved  Burning sensation of feet-resolved  Muscle spasms  Positive MAURY  Low B6-improved  Elevated CK  Medication side effects    This is a 64 year old female with episodes of left dorsal foot rash with burning pain and muscle spasms in the left foot.  These episodes last for about 1 day without any provoking factor.  No other previous autoimmune disease issues that does have a history of thyroid cancer.  Occasionally can get some swelling in the right foot.    EMG overall was normal.  There was slightly low normal amplitude of the left tibial motor nerve most likely artifactual.  Blood work has been negative except for persistent elevated CK which could be related to overactivity at work.  She did not do the needle study to rule out any muscle disease.  MAURY was positive.  Vitamin B6 was on the lower end and she is taking a multivitamin with no improvement.    Symptoms could be nonneurological in nature.  Her initially presenting symptoms have resolved with stopping the CellCept.    She does complain of muscle spasms in both legs now along with some muscle spasms in her chest.  Could be related to the lumbar disc herniation.  She at this point is refusing repeat MRI of the L-spine.  Could consider x-ray of the L-spine.  Will check blood work to look for common causes of muscle spasms.  Could consider repeating the needle study given her elevated CK.  Will recheck CK and other blood work.  Started on tizanidine.  She is already on Flexeril without benefit.  She does have a history of restless leg and is on pramipexole.  Could be  related to autoimmune disease.  Gabapentin previously caused side effects.    Return back in 5 to 6 months      Return in about 5 months (around 9/2/2025) for In-Clinic Visit (must), After testing.    -     tiZANidine (ZANAFLEX) 2 MG tablet; Take 1 tablet (2 mg) by mouth 3 times daily as needed for muscle spasms.  -     Magnesium; Future  -     Comprehensive metabolic panel; Future  -     CK total; Future    Over 35 minutes were spent coordinating the care for the patient on the day of the encounter.  This includes previsit, during visit and post visit activities as documented above.  Counseling patient.  Prescription management.  New problem.  Testing ordered.  (Activities include but not inclusive of reviewing chart, reviewing outside records, reviewing labs and imaging study results as well as the images, patient visit time including getting history and exam,  use if applicable, review of test results with the patient and coming up with a plan in a shared model, counseling patient and family, education and answering patient questions, EMR , EMR diagnosis entry and problem list management, medication reconciliation and prescription management if applicable, paperwork if applicable, printing documents and documentation of the visit activities.)        Bipin Miller MD  Neurologist  [facility] Neurology [facility]  Tel:- [phone]    This note was dictated using voice recognition software.  Any grammatical or context distortions are unintentional and inherent to the software.    The longitudinal plan of care for the diagnosis(es)/condition(s) as documented were addressed during this visit. Due to the added complexity in care, I will continue to support Chante in the subsequent management and with ongoing continuity of care.      Again, thank you for allowing me to participate in the care of your patient.        Sincerely,        Bipin Miller MD    Electronically signed

## 2025-04-02 NOTE — PROGRESS NOTES
NEUROLOGY OUTPATIENT PROGRESS NOTE   Apr 2, 2025     CHIEF COMPLAINT/REASON FOR VISIT/REASON FOR CONSULT  Patient presents with:  Burning sensation feet: Patient states she had some burning and pain on both feet yesterday. She stated she took herself off her medication salsalate. Follow up    REASON FOR CONSULTATION-foot problems    REFERRAL SOURCE  Dr. Maria Esther Ho  CC Dr. Maria Esther Ho    HISTORY OF PRESENT ILLNESS  Chante Tracy is a 64 year old female seen today for evaluation of foot problems.  Symptoms have been going on for the last 6 months to year.  She reports that she will have episodes lasting for 1 day to several days where she will get some spasms in her left foot.  There will also be a rash in the left dorsal portion.  No itching no numbness.  Denies any back pain or any shooting pain down the legs.  There is occasionally some swelling of the right foot though symptoms/rash are mainly on the left side.  She does have a history of restless leg syndrome.  Has tried pramipexole and Flexeril.  Currently is wearing orthopedic shoes.  Does have a history of elevated CK of unclear etiology.  She is concerned about gout.    He has previously had a EMG and does not want to do the needle study.    11/13/24  Patient returns today.  Symptoms are about the same.  Standing makes the pain symptoms worse.  Reports about a rash on the foot as well.  Has seen rheumatology and no rheumatology could diagnosis been made.  Does have a history of a herniated disc at the L4 level.  Does report some muscle loss in both legs.  She tried the gabapentin though could not tolerate it.  Is on a multivitamin with B6.    4/2/25  Patient returns today  1.  She had stopped the CellCept that resolved the rash as well as the pain in the feet that she was having  2.  Continues to have muscle spasms in her legs.  This is both legs.  Does have some spasms in her mid chest as well.  Does drink plenty of water.  3.  Has been diagnosed to  have carpal tunnel in her hands by rheumatology.  Also has rheumatoid arthritis in her hands.  Has been using carpal tunnel braces which might be helping.  Does not want to do surgery.  There are some plans to do steroid injections  No new concerns    Previous history is reviewed and this is unchanged.    PAST MEDICAL/SURGICAL HISTORY  No past medical history on file.  There is no problem list on file for this patient.  Second for vision loss, depression, cancer/leukemia, high blood pressure, high cholesterol    FAMILY HISTORY  Family History   Problem Relation Age of Onset    Breast Cancer No family hx of    Positive for restless leg syndrome in her mother and sister    SOCIAL HISTORY  Social History     Tobacco Use    Smoking status: Former     Types: Cigarettes       SYSTEMS REVIEW  Twelve-system ROS was done and other than the HPI this was negative/positive for arm and leg pain, weakness/tingling, weakness/paralysis, difficulty walking, sleeping problems due to restless leg syndrome, depression, bloating, bowel problems, respiratory problems, skin changes, weight gain, snoring loudly when exhausted.    MEDICATIONS  Current Outpatient Medications   Medication Sig Dispense Refill    amitriptyline (ELAVIL) 10 MG tablet Take 10 mg by mouth At Bedtime      calcium citrate-vitamin D (CITRACAL) 200-6.25 MG-MCG TABS per tablet Take 1 tablet by mouth 2 times daily.      cholecalciferol (VITAMIN D3) 5000 units (125 mcg) capsule Take 5,000 Units by mouth daily      cyclobenzaprine (FLEXERIL) 10 MG tablet Take 10 mg by mouth 3 times daily as needed for muscle spasms      folic acid (FOLVITE) 1 MG tablet Take 1 tablet (1 mg) by mouth daily. 90 tablet 0    levothyroxine (SYNTHROID/LEVOTHROID) 200 MCG tablet Take 200 mcg by mouth daily      levothyroxine (SYNTHROID/LEVOTHROID) 25 MCG tablet Take 25 mcg by mouth daily.      loperamide (IMODIUM A-D) 2 MG tablet Take 2 mg by mouth 4 times daily as needed for diarrhea       "methotrexate 2.5 MG tablet Take 8 tablets (20 mg) by mouth every 7 days. 32 tablet 0    multivitamin w/minerals (MULTI-VITAMIN) tablet Take 1 tablet by mouth daily      omeprazole (PRILOSEC) 20 MG DR capsule Take 20 mg by mouth      pramipexole (MIRAPEX) 0.125 MG tablet Take 0.125 mg by mouth 2 times daily      pramipexole (MIRAPEX) 0.5 MG tablet Take 1 mg by mouth 2 times daily       predniSONE (DELTASONE) 5 MG tablet Take 1 tablet (5 mg) by mouth daily. 30 tablet 0    SPIRIVA RESPIMAT 2.5 MCG/ACT inhaler Inhale 2 puffs into the lungs daily      tiZANidine (ZANAFLEX) 2 MG tablet Take 1 tablet (2 mg) by mouth 3 times daily as needed for muscle spasms. 270 tablet 1    valsartan (DIOVAN) 160 MG tablet Take 1 tablet by mouth daily at 2 pm      salsalate (DISALCID) 500 MG tablet Take 1,000 mg by mouth 2 times daily (Patient not taking: Reported on 3/27/2025)       No current facility-administered medications for this visit.        PHYSICAL EXAMINATION  VITALS: BP (!) 154/102   Pulse 92   Ht 1.6 m (5' 3\")   Wt 112.3 kg (247 lb 8 oz)   BMI 43.84 kg/m    GENERAL: Healthy appearing, alert, no acute distress, normal habitus.  CARDIOVASCULAR: Extremities warm and well perfused. Pulses present.   NEUROLOGICAL:  Patient is awake and oriented to self, place and time.  Attention span is normal.  Memory is grossly intact.  Language is fluent and follows commands appropriately.  Appropriate fund of knowledge. Cranial nerves 2-12 are intact. There is no pronator drift.  Motor exam shows 5/5 strength in all extremities.  Tone is symmetric bilaterally in upper and lower extremities.  Reflexes are symmetric and 1+ in upper extremities and lower extremities. Sensory exam is grossly intact to light touch, pin prick and vibration.  Finger to nose and heel to shin is without dysmetria.  Romberg is negative.  Gait is normal and the patient is able to do tandem walk and walk on toes and heels.  Exam stable.    DIAGNOSTICS  RELEVANT " LABS  TSH 1.32  CMP noncontributory  TSH 4.76    OUTSIDE RECORDS  Outside referral notes and chart notes were reviewed and pertinent information has been summarized (in addition to the HPI):-    Labs    Component      Latest Ref Rng 6/26/2024  11:40 AM   Albumin Fraction      3.7 - 5.1 g/dL 4.2    Alpha 1 Fraction      0.2 - 0.4 g/dL 0.3    Alpha 2 Fraction      0.5 - 0.9 g/dL 0.8    Beta Fraction      0.6 - 1.0 g/dL 0.9    Gamma Fraction      0.7 - 1.6 g/dL 1.0    Monoclonal Peak      <=0.0 g/dL 0.0    ELP Interpretation: Essentially normal electrophoretic pattern. No obvious monoclonal proteins seen. Pathologic significance requires clinical correlation. ALYSSIA Trimble M.D., Ph.D., Pathologist ().    Signout Location if Remote [facility]    Signout Location if Remote [facility]    Lyme Confirm IgG by CLIA Instrument Value      <0.90 Index 0.06    Lyme Confirm IgG by CLIA Blood      Nonreactive  Nonreactive    Lyme Confirm IgM by CLIA Instrument Value      <0.90 Index 0.27    Lyme Confirm IgM by CLIA Blood      Nonreactive  Nonreactive    MAURY interpretation      Negative  Positive !    MAURY pattern 1 Homogeneous    MAURY titer 1 1:160    Immunofixation ELP No monoclonal protein seen on immunofixation. Pathologic significance requires clinical correlation.  ALYSSIA Trimble M.D., Ph.D., Pathologist ()    Vitamin B6      20.0 - 125.0 nmol/L 19.4 (L)    Vitamin B12      232 - 1,245 pg/mL 569    Vitamin B1 Whole Blood Level      70 - 180 nmol/L 117    Lyme Disease Antibodies Serum      <0.90  0.99 (H)    Copper      80.0 - 155.0 ug/dL 129.5    Uric Acid      2.4 - 5.7 mg/dL 4.3    Hemoglobin A1C      <5.7 % 5.4    CK Total      26 - 192 U/L 546 (H)    Total Protein Serum for ELP      6.4 - 8.3 g/dL 7.2       Legend:  ! Abnormal  (L) Low  (H) High    EMG  CLINICAL INTERPRETATION:  This is a normal nerve conduction study.  The EMG study was not done.  The low normal amplitude of the left tibial motor nerve  conduction study is of unclear significance.  Further clinical correlation is needed.     IMPRESSION/REPORT/PLAN  Rash-resolved  Burning sensation of feet-resolved  Muscle spasms  Positive MAURY  Low B6-improved  Elevated CK  Medication side effects    This is a 64 year old female with episodes of left dorsal foot rash with burning pain and muscle spasms in the left foot.  These episodes last for about 1 day without any provoking factor.  No other previous autoimmune disease issues that does have a history of thyroid cancer.  Occasionally can get some swelling in the right foot.    EMG overall was normal.  There was slightly low normal amplitude of the left tibial motor nerve most likely artifactual.  Blood work has been negative except for persistent elevated CK which could be related to overactivity at work.  She did not do the needle study to rule out any muscle disease.  MAURY was positive.  Vitamin B6 was on the lower end and she is taking a multivitamin with no improvement.    Symptoms could be nonneurological in nature.  Her initially presenting symptoms have resolved with stopping the CellCept.    She does complain of muscle spasms in both legs now along with some muscle spasms in her chest.  Could be related to the lumbar disc herniation.  She at this point is refusing repeat MRI of the L-spine.  Could consider x-ray of the L-spine.  Will check blood work to look for common causes of muscle spasms.  Could consider repeating the needle study given her elevated CK.  Will recheck CK and other blood work.  Started on tizanidine.  She is already on Flexeril without benefit.  She does have a history of restless leg and is on pramipexole.  Could be related to autoimmune disease.  Gabapentin previously caused side effects.    Return back in 5 to 6 months      Return in about 5 months (around 9/2/2025) for In-Clinic Visit (must), After testing.    -     tiZANidine (ZANAFLEX) 2 MG tablet; Take 1 tablet (2 mg) by mouth 3  times daily as needed for muscle spasms.  -     Magnesium; Future  -     Comprehensive metabolic panel; Future  -     CK total; Future    Over 35 minutes were spent coordinating the care for the patient on the day of the encounter.  This includes previsit, during visit and post visit activities as documented above.  Counseling patient.  Prescription management.  New problem.  Testing ordered.  (Activities include but not inclusive of reviewing chart, reviewing outside records, reviewing labs and imaging study results as well as the images, patient visit time including getting history and exam,  use if applicable, review of test results with the patient and coming up with a plan in a shared model, counseling patient and family, education and answering patient questions, EMR , EMR diagnosis entry and problem list management, medication reconciliation and prescription management if applicable, paperwork if applicable, printing documents and documentation of the visit activities.)        Bipin Miller MD  Neurologist  [facility] Neurology [facility]  Tel:- [phone]    This note was dictated using voice recognition software.  Any grammatical or context distortions are unintentional and inherent to the software.    The longitudinal plan of care for the diagnosis(es)/condition(s) as documented were addressed during this visit. Due to the added complexity in care, I will continue to support Chante in the subsequent management and with ongoing continuity of care.

## 2025-04-18 ENCOUNTER — MYC REFILL (OUTPATIENT)
Dept: RHEUMATOLOGY | Facility: CLINIC | Age: 65
End: 2025-04-18

## 2025-04-18 DIAGNOSIS — R76.8 ANA POSITIVE: ICD-10-CM

## 2025-04-18 DIAGNOSIS — M06.4 INFLAMMATORY POLYARTHRITIS (H): ICD-10-CM

## 2025-04-21 RX ORDER — METHOTREXATE 2.5 MG/1
20 TABLET ORAL
Qty: 32 TABLET | Refills: 0 | OUTPATIENT
Start: 2025-04-21

## 2025-04-24 DIAGNOSIS — M06.4 INFLAMMATORY POLYARTHRITIS (H): ICD-10-CM

## 2025-04-24 DIAGNOSIS — R76.8 ANA POSITIVE: ICD-10-CM

## 2025-04-29 ENCOUNTER — OFFICE VISIT (OUTPATIENT)
Dept: RHEUMATOLOGY | Facility: CLINIC | Age: 65
End: 2025-04-29
Payer: COMMERCIAL

## 2025-04-29 VITALS
BODY MASS INDEX: 42.9 KG/M2 | WEIGHT: 242.2 LBS | HEART RATE: 86 BPM | OXYGEN SATURATION: 95 % | SYSTOLIC BLOOD PRESSURE: 148 MMHG | DIASTOLIC BLOOD PRESSURE: 87 MMHG

## 2025-04-29 DIAGNOSIS — R76.8 ANA POSITIVE: ICD-10-CM

## 2025-04-29 DIAGNOSIS — G56.02 CARPAL TUNNEL SYNDROME ON LEFT: ICD-10-CM

## 2025-04-29 DIAGNOSIS — M06.4 INFLAMMATORY POLYARTHRITIS (H): Primary | ICD-10-CM

## 2025-04-29 DIAGNOSIS — M25.50 POLYARTHRALGIA: ICD-10-CM

## 2025-04-29 DIAGNOSIS — Z79.899 HIGH RISK MEDICATION USE: ICD-10-CM

## 2025-04-29 PROCEDURE — 99214 OFFICE O/P EST MOD 30 MIN: CPT | Mod: 25 | Performed by: INTERNAL MEDICINE

## 2025-04-29 PROCEDURE — 3079F DIAST BP 80-89 MM HG: CPT | Performed by: INTERNAL MEDICINE

## 2025-04-29 PROCEDURE — 3077F SYST BP >= 140 MM HG: CPT | Performed by: INTERNAL MEDICINE

## 2025-04-29 PROCEDURE — 20526 THER INJECTION CARP TUNNEL: CPT | Mod: LT | Performed by: INTERNAL MEDICINE

## 2025-04-29 RX ORDER — METHOTREXATE 2.5 MG/1
20 TABLET ORAL
Qty: 32 TABLET | Refills: 1 | Status: SHIPPED | OUTPATIENT
Start: 2025-04-29

## 2025-04-29 RX ORDER — FOLIC ACID 1 MG/1
1 TABLET ORAL DAILY
Qty: 90 TABLET | Refills: 0 | Status: SHIPPED | OUTPATIENT
Start: 2025-04-29

## 2025-04-29 RX ORDER — TRIAMCINOLONE ACETONIDE 40 MG/ML
20 INJECTION, SUSPENSION INTRA-ARTICULAR; INTRAMUSCULAR ONCE
Status: COMPLETED | OUTPATIENT
Start: 2025-04-29 | End: 2025-04-29

## 2025-04-29 RX ADMIN — TRIAMCINOLONE ACETONIDE 20 MG: 40 INJECTION, SUSPENSION INTRA-ARTICULAR; INTRAMUSCULAR at 09:22

## 2025-04-29 NOTE — PROGRESS NOTES
Rheumatology follow-up visit note     Chante is a 64 year old female presents today for follow-up.    Chante was seen today for recheck.    Diagnoses and all orders for this visit:    Inflammatory polyarthritis (H)  -     methotrexate 2.5 MG tablet; Take 8 tablets (20 mg) by mouth every 7 days.  -     folic acid (FOLVITE) 1 MG tablet; Take 1 tablet (1 mg) by mouth daily.    Polyarthralgia    MAURY positive  -     methotrexate 2.5 MG tablet; Take 8 tablets (20 mg) by mouth every 7 days.  -     folic acid (FOLVITE) 1 MG tablet; Take 1 tablet (1 mg) by mouth daily.    High risk medication use    Carpal tunnel syndrome on left  -     triamcinolone (KENALOG-40) injection 20 mg  -     INJECTION THERAPEUTIC, CARPAL TUNNEL            After pros and cons were discussed including risk of infection, bleeding, skin changes including thinning, pigmentary alteration and scarring to name a few, left Carpal Tunnel injected as noted in the orders section. This was done with nontouch technique.  The patient tolerated the procedure well and had a brisk Marcaine effect.  The postinjection care was discussed.      Follow up in 3 months.    HPI    Chante Tracy is a 64 year old female is here for follow-up of   inflammatory polyarthritis affecting her hands, in the background of positive antinuclear antibody at 1: 160, With the homogenous pattern.  She has tolerated methotrexate 20 mg that she is placed for the morning of at night on Fridays.  Recent labs are within acceptable range.  Her CK remains persistently elevated going back several years at the similar range.  She had been taking prednisone at bedtime we discussed this she will switch to mornings.  Her joint pains have improved significantly in the hands she has more numbness tingling pain in the left hand worse with activity she feels that the epicenter of this lies on the ventral surface of the wrist.  13 years ago she recalls having had carpal tunnel release.  She does  "not want to go consider that again.  She is woken up from sleep because of this when she wakes up she \"shakes\" her hands.  She would like to go for corticosteroid injection and she remembers having this done in the past.  Budesonide is noted as one of the allergies.  We discussed this the 2 different salts, furthermore she is tolerated prednisone without \"rash\" noted with budesonide.  She has been taking her prednisone at bedtime I have asked her to change that to first thing in the morning and rationale for that was outlined.  When she runs out of the current course she does not need to take it anymore.  Following is the excerpt from a previous note:     While she has tolerated methotrexate at 10 mg with the normal liver and kidney as well as hemogram as she feels that the pain in the hands has gotten worse.  She has been on prednisone 5 mg during this time.  Previously the prednisone had been more helpful.  However as we discussed more of this pain it turns out that this is more of a \"numbness\" type pain which is reminiscent of carpal tunnel syndrome which she was diagnosed several years ago and had release surgery done which she thinks was not done properly and is not interested in surgery for the right hand.  She has more than 1 types of braces that she wears off and on.  She has been dropping things such as her cell phone.  This is from the right hand.  This is not waking her up from sleep.  Her CK that has remained elevated for as far as she can recall 25 years, most recent record that is available here is from November 2018 remains at the same range as it has been over the past 7 years or so.    DETAILED EXAMINATION  04/29/25  :    Vitals:    04/29/25 0825   BP: (!) 148/87   BP Location: Right arm   Pulse: 86   SpO2: 95%   Weight: 109.9 kg (242 lb 3.2 oz)     Alert oriented. Head including the face is examined for malar rash, heliotropes, scarring, lupus pernio. Eyes examined for redness such as in " episcleritis/scleritis, periorbital lesions.   Neck/ Face examined for parotid gland swelling, range of motion of neck.  Left upper and lower and right upper and lower extremities examined for tenderness, swelling, warmth of the appendicular joints, range of motion, edema, rash.  Some of the important findings included: she does not have evidence of synovitis in the palpable joints of the upper extremities.  There is no residual tenderness at the metacarpophalangeal or the proximal interphalangeal joints.  There is no dactylitis.  She has marked positive Tinel on the left wrist volar aspect.  There are no active problems to display for this patient.    No past surgical history on file.   No past medical history on file.  Allergies   Allergen Reactions    Celecoxib Hives and Nausea and Vomiting    Hydrochlorothiazide-Triamterene Hives     Nose bleeds    Indapamide Hives    Lisinopril Hives    Penicillins Other (See Comments) and Hives     Family hx    Simvastatin Hives    Bacitracin Other (See Comments)     Eats away skin    Belladonna GI Disturbance     Can't digest    Ethacrynic Acid Other (See Comments)     Slept all day    Fish Allergy GI Disturbance     Can't digest    Hydrochlorothiazide Other (See Comments)    Ibuprofen Other (See Comments)     Causes ulcers   Eats lining of stomach    Causes ulcers    Triamterene Hives     Nose bleeds    Budesonide-Formoterol Fumarate Rash    Diazepam Other (See Comments) and Palpitations     Heart racing    Menthol Rash     Icy Hot patch    Neomycin Rash    Neomycin-Bacitracin Zn-Polymyx Rash     Eats away skin    Polymyxin B Rash     Eats away skin    Sulfa Antibiotics Hives and Other (See Comments)     Family hx     Current Outpatient Medications   Medication Sig Dispense Refill    amitriptyline (ELAVIL) 10 MG tablet Take 10 mg by mouth At Bedtime      calcium citrate-vitamin D (CITRACAL) 200-6.25 MG-MCG TABS per tablet Take 1 tablet by mouth 2 times daily.       cholecalciferol (VITAMIN D3) 5000 units (125 mcg) capsule Take 5,000 Units by mouth daily      cyclobenzaprine (FLEXERIL) 10 MG tablet Take 10 mg by mouth 3 times daily as needed for muscle spasms      folic acid (FOLVITE) 1 MG tablet Take 1 tablet (1 mg) by mouth daily. 90 tablet 0    levothyroxine (SYNTHROID/LEVOTHROID) 200 MCG tablet Take 200 mcg by mouth daily      levothyroxine (SYNTHROID/LEVOTHROID) 25 MCG tablet Take 25 mcg by mouth daily.      loperamide (IMODIUM A-D) 2 MG tablet Take 2 mg by mouth 4 times daily as needed for diarrhea      methotrexate 2.5 MG tablet Take 8 tablets (20 mg) by mouth every 7 days. 32 tablet 0    multivitamin w/minerals (MULTI-VITAMIN) tablet Take 1 tablet by mouth daily      omeprazole (PRILOSEC) 20 MG DR capsule Take 20 mg by mouth      pramipexole (MIRAPEX) 0.125 MG tablet Take 0.125 mg by mouth 2 times daily      pramipexole (MIRAPEX) 0.5 MG tablet Take 1 mg by mouth 2 times daily       predniSONE (DELTASONE) 5 MG tablet Take 1 tablet (5 mg) by mouth daily. 30 tablet 0    salsalate (DISALCID) 500 MG tablet Take 1,000 mg by mouth 2 times daily (Patient not taking: Reported on 3/27/2025)      SPIRIVA RESPIMAT 2.5 MCG/ACT inhaler Inhale 2 puffs into the lungs daily      tiZANidine (ZANAFLEX) 2 MG tablet Take 1 tablet (2 mg) by mouth 3 times daily as needed for muscle spasms. 270 tablet 1    valsartan (DIOVAN) 160 MG tablet Take 1 tablet by mouth daily at 2 pm       family history is not on file.  Social Connections: Unknown (2/3/2023)    Received from [facility]    Social Connections     Frequency of Communication with Friends and Family: Not on file          WBC Count   Date Value Ref Range Status   04/18/2025 5.8 4.0 - 11.0 10e3/uL Final     RBC Count   Date Value Ref Range Status   04/18/2025 4.22 3.80 - 5.20 10e6/uL Final     Hemoglobin   Date Value Ref Range Status   04/18/2025 11.8 11.7 - 15.7 g/dL Final     Hematocrit   Date Value Ref  Range Status   04/18/2025 38.9 35.0 - 47.0 % Final     MCV   Date Value Ref Range Status   04/18/2025 92 78 - 100 fL Final     MCH   Date Value Ref Range Status   04/18/2025 28.0 26.5 - 33.0 pg Final     Platelet Count   Date Value Ref Range Status   04/18/2025 257 150 - 450 10e3/uL Final     % Lymphocytes   Date Value Ref Range Status   01/30/2025 21 % Final     AST   Date Value Ref Range Status   04/02/2025 27 0 - 45 U/L Final     ALT   Date Value Ref Range Status   04/18/2025 33 0 - 50 U/L Final     Albumin   Date Value Ref Range Status   04/18/2025 4.2 3.5 - 5.2 g/dL Final   11/12/2019 4.1 3.5 - 5.0 g/dL Final     Alkaline Phosphatase   Date Value Ref Range Status   04/02/2025 112 40 - 150 U/L Final     Creatinine   Date Value Ref Range Status   04/18/2025 0.81 0.51 - 0.95 mg/dL Final     GFR Estimate   Date Value Ref Range Status   04/18/2025 82 >60 mL/min/1.73m2 Final     Comment:     eGFR calculated using 2021 CKD-EPI equation.   11/03/2020 >60 >60 mL/min/1.73m2 Final     GFR Estimate If Black   Date Value Ref Range Status   11/03/2020 >60 >60 mL/min/1.73m2 Final     Erythrocyte Sedimentation Rate   Date Value Ref Range Status   02/06/2025 37 (H) 0 - 30 mm/hr Final                 Next Month's Dosage: Discontinue

## 2025-04-30 RX ORDER — METHOTREXATE 2.5 MG/1
20 TABLET ORAL
Qty: 32 TABLET | Refills: 0 | OUTPATIENT
Start: 2025-04-30 | End: 2025-05-30

## 2025-05-28 ENCOUNTER — PATIENT OUTREACH (OUTPATIENT)
Dept: CARE COORDINATION | Facility: CLINIC | Age: 65
End: 2025-05-28
Payer: COMMERCIAL

## 2025-06-26 ENCOUNTER — LAB (OUTPATIENT)
Dept: LAB | Facility: CLINIC | Age: 65
End: 2025-06-26
Payer: COMMERCIAL

## 2025-06-26 ENCOUNTER — RESULTS FOLLOW-UP (OUTPATIENT)
Dept: RHEUMATOLOGY | Facility: CLINIC | Age: 65
End: 2025-06-26

## 2025-06-26 DIAGNOSIS — M25.50 POLYARTHRALGIA: ICD-10-CM

## 2025-06-26 DIAGNOSIS — M06.4 INFLAMMATORY POLYARTHRITIS (H): ICD-10-CM

## 2025-06-26 LAB
ALBUMIN SERPL BCG-MCNC: 4.2 G/DL (ref 3.5–5.2)
ALT SERPL W P-5'-P-CCNC: 33 U/L (ref 0–50)
CK SERPL-CCNC: 325 U/L (ref 26–192)
CREAT SERPL-MCNC: 0.91 MG/DL (ref 0.51–0.95)
CRP SERPL-MCNC: 6.74 MG/L
EGFRCR SERPLBLD CKD-EPI 2021: 71 ML/MIN/1.73M2
ERYTHROCYTE [DISTWIDTH] IN BLOOD BY AUTOMATED COUNT: 14.7 % (ref 10–15)
HCT VFR BLD AUTO: 36 % (ref 35–47)
HGB BLD-MCNC: 11.4 G/DL (ref 11.7–15.7)
MCH RBC QN AUTO: 29.6 PG (ref 26.5–33)
MCHC RBC AUTO-ENTMCNC: 31.7 G/DL (ref 31.5–36.5)
MCV RBC AUTO: 94 FL (ref 78–100)
PLATELET # BLD AUTO: 269 10E3/UL (ref 150–450)
RBC # BLD AUTO: 3.85 10E6/UL (ref 3.8–5.2)
WBC # BLD AUTO: 5.7 10E3/UL (ref 4–11)

## 2025-06-30 ENCOUNTER — OFFICE VISIT (OUTPATIENT)
Dept: RHEUMATOLOGY | Facility: CLINIC | Age: 65
End: 2025-06-30
Payer: COMMERCIAL

## 2025-06-30 VITALS
WEIGHT: 237.3 LBS | OXYGEN SATURATION: 98 % | SYSTOLIC BLOOD PRESSURE: 132 MMHG | BODY MASS INDEX: 42.04 KG/M2 | DIASTOLIC BLOOD PRESSURE: 80 MMHG | RESPIRATION RATE: 19 BRPM | HEART RATE: 82 BPM

## 2025-06-30 DIAGNOSIS — Z85.850 HX OF THYROID CANCER: ICD-10-CM

## 2025-06-30 DIAGNOSIS — Z79.899 HIGH RISK MEDICATION USE: ICD-10-CM

## 2025-06-30 DIAGNOSIS — M06.4 INFLAMMATORY POLYARTHRITIS (H): Primary | ICD-10-CM

## 2025-06-30 DIAGNOSIS — M25.50 POLYARTHRALGIA: ICD-10-CM

## 2025-06-30 DIAGNOSIS — R76.8 ANA POSITIVE: ICD-10-CM

## 2025-06-30 DIAGNOSIS — R74.8 ELEVATED CK: ICD-10-CM

## 2025-06-30 PROCEDURE — G2211 COMPLEX E/M VISIT ADD ON: HCPCS | Performed by: INTERNAL MEDICINE

## 2025-06-30 PROCEDURE — 99214 OFFICE O/P EST MOD 30 MIN: CPT | Performed by: INTERNAL MEDICINE

## 2025-06-30 PROCEDURE — 3075F SYST BP GE 130 - 139MM HG: CPT | Performed by: INTERNAL MEDICINE

## 2025-06-30 PROCEDURE — 3079F DIAST BP 80-89 MM HG: CPT | Performed by: INTERNAL MEDICINE

## 2025-06-30 PROCEDURE — 1125F AMNT PAIN NOTED PAIN PRSNT: CPT | Performed by: INTERNAL MEDICINE

## 2025-06-30 RX ORDER — FOLIC ACID 1 MG/1
1 TABLET ORAL DAILY
Qty: 90 TABLET | Refills: 0 | Status: SHIPPED | OUTPATIENT
Start: 2025-06-30

## 2025-06-30 ASSESSMENT — PAIN SCALES - GENERAL: PAINLEVEL_OUTOF10: MILD PAIN (3)

## 2025-06-30 NOTE — PROGRESS NOTES
"      Rheumatology follow-up visit note     Chante is a 64 year old female presents today for follow-up.    Chante was seen today for recheck.    Diagnoses and all orders for this visit:    Inflammatory polyarthritis (H)  -     folic acid (FOLVITE) 1 MG tablet; Take 1 tablet (1 mg) by mouth daily.    MAURY positive  -     folic acid (FOLVITE) 1 MG tablet; Take 1 tablet (1 mg) by mouth daily.    Polyarthralgia    High risk medication use    Elevated CK    Hx of thyroid cancer        She is to continue methotrexate with folic acid, will meet in 3 months with labs prior.  On her next visit consider duloxetine.  The longitudinal plan of care for the diagnosis(es)/condition(s) as documented were addressed during this visit. Due to the added complexity in care, I will continue to support Chante in the subsequent management and with ongoing continuity of care.    Follow up in 3 months.  Labs prior.    HPI    Chante Tracy is a 64 year old female, works at a [employer] long hours, is here for follow-up of inflammatory polyarthritis affecting her hands, in the background of positive antinuclear antibody at 1: 160, With the homogenous pattern.  She has tolerated methotrexate 20 mg that she is placed for the morning of at night on Fridays.  Recent labs are within acceptable range.  Her CK remains persistently elevated going back several years at the similar range.  She had been taking prednisone at bedtime we discussed this she will switch to mornings.  Her joint pains have improved significantly in the hands she has more numbness tingling pain in the left hand worse with activity she feels that the epicenter of this lies on the ventral surface of the wrist.  13 years ago she recalls having had carpal tunnel release.  She does not want to go consider that again.  She is woken up from sleep because of this when she wakes up she \"shakes\" her hands.  She would like to go for corticosteroid injection and she remembers having this done in " "the past.  She noted there was improvement with the corticosteroid injection into the left carpal tunnel it felt as if the tendon has \"opened up, did not feel as tight, however the benefit has already dissipated by now and she is back to where she started, the injection was done almost 2 months or so ago.  Her surgeons have told her that there is nothing they can offer her for the carpal tunnel intervention as having failed the 12-week of hand therapy and other findings they felt was secondary to the scarring and would not recommend surgical intervention.  She is no longer on prednisone has tolerated methotrexate that we will continue.        Following is the excerpt from a previous note:    Budesonide is noted as one of the allergies.  We discussed this the 2 different salts, furthermore she is tolerated prednisone without \"rash\" noted with budesonide.  She has been taking her prednisone at bedtime I have asked her to change that to first thing in the morning and rationale for that was outlined.  When she runs out of the current course she does not need to take it anymore.     DETAILED EXAMINATION  06/30/25  :    Vitals:    06/30/25 0901   BP: 132/80   BP Location: Right arm   Patient Position: Sitting   Cuff Size: Adult Large   Pulse: 82   Resp: 19   SpO2: 98%   Weight: 107.6 kg (237 lb 4.8 oz)     Alert oriented. Head including the face is examined for malar rash, heliotropes, scarring, lupus pernio. Eyes examined for redness such as in episcleritis/scleritis, periorbital lesions.   Neck/ Face examined for parotid gland swelling, range of motion of neck.  Left upper and lower and right upper and lower extremities examined for tenderness, swelling, warmth of the appendicular joints, range of motion, edema, rash.  Some of the important findings included: she does not have evidence of synovitis in the palpable joints of the upper extremities.  No significant deformities of the digits.  no Heberden nodes.  Range of " motion of the shoulders  show full abduction.  No JLT effusion or warmth of the knees.  she does not have dactylitis of the digits.  Subtle scar anterior neck for the thyroid surgery for thyroid cancer in the past.     There are no active problems to display for this patient.    No past surgical history on file.   No past medical history on file.  Allergies   Allergen Reactions    Celecoxib Hives and Nausea and Vomiting    Hydrochlorothiazide-Triamterene Hives     Nose bleeds    Indapamide Hives    Lisinopril Hives    Penicillins Other (See Comments) and Hives     Family hx    Simvastatin Hives    Bacitracin Other (See Comments)     Eats away skin    Belladonna GI Disturbance     Can't digest    Ethacrynic Acid Other (See Comments)     Slept all day    Fish Allergy GI Disturbance     Can't digest    Hydrochlorothiazide Other (See Comments)    Ibuprofen Other (See Comments)     Causes ulcers   Eats lining of stomach    Causes ulcers    Triamterene Hives     Nose bleeds    Budesonide-Formoterol Fumarate Rash    Diazepam Other (See Comments) and Palpitations     Heart racing    Menthol Rash     Icy Hot patch    Neomycin Rash    Neomycin-Bacitracin Zn-Polymyx Rash     Eats away skin    Polymyxin B Rash     Eats away skin    Sulfa Antibiotics Hives and Other (See Comments)     Family hx     Current Outpatient Medications   Medication Sig Dispense Refill    amitriptyline (ELAVIL) 10 MG tablet Take 10 mg by mouth At Bedtime      calcium citrate-vitamin D (CITRACAL) 200-6.25 MG-MCG TABS per tablet Take 1 tablet by mouth 2 times daily.      cholecalciferol (VITAMIN D3) 5000 units (125 mcg) capsule Take 5,000 Units by mouth daily      cyclobenzaprine (FLEXERIL) 10 MG tablet Take 10 mg by mouth 3 times daily as needed for muscle spasms (Patient not taking: Reported on 4/29/2025)      folic acid (FOLVITE) 1 MG tablet Take 1 tablet (1 mg) by mouth daily. 90 tablet 0    levothyroxine (SYNTHROID/LEVOTHROID) 200 MCG tablet Take 200  mcg by mouth daily      levothyroxine (SYNTHROID/LEVOTHROID) 25 MCG tablet Take 25 mcg by mouth daily. (Patient not taking: Reported on 4/29/2025)      loperamide (IMODIUM A-D) 2 MG tablet Take 2 mg by mouth 4 times daily as needed for diarrhea      methotrexate 2.5 MG tablet Take 8 tablets by mouth every 7 days 32 tablet 2    multivitamin w/minerals (MULTI-VITAMIN) tablet Take 1 tablet by mouth daily      omeprazole (PRILOSEC) 20 MG DR capsule Take 20 mg by mouth      pramipexole (MIRAPEX) 0.125 MG tablet Take 0.125 mg by mouth 2 times daily      pramipexole (MIRAPEX) 0.5 MG tablet Take 1 mg by mouth 2 times daily       salsalate (DISALCID) 500 MG tablet Take 1,000 mg by mouth 2 times daily (Patient not taking: Reported on 4/29/2025)      SPIRIVA RESPIMAT 2.5 MCG/ACT inhaler Inhale 2 puffs into the lungs daily      tiZANidine (ZANAFLEX) 2 MG tablet Take 1 tablet (2 mg) by mouth 3 times daily as needed for muscle spasms. 270 tablet 1    valsartan (DIOVAN) 160 MG tablet Take 1 tablet by mouth daily at 2 pm       family history is not on file.  Social Connections: Unknown (2/3/2023)    [facility]    Social Connections     Frequency of Communication with Friends and Family: Not on file          WBC Count   Date Value Ref Range Status   06/26/2025 5.7 4.0 - 11.0 10e3/uL Final     RBC Count   Date Value Ref Range Status   06/26/2025 3.85 3.80 - 5.20 10e6/uL Final     Hemoglobin   Date Value Ref Range Status   06/26/2025 11.4 (L) 11.7 - 15.7 g/dL Final     Hematocrit   Date Value Ref Range Status   06/26/2025 36.0 35.0 - 47.0 % Final     MCV   Date Value Ref Range Status   06/26/2025 94 78 - 100 fL Final     MCH   Date Value Ref Range Status   06/26/2025 29.6 26.5 - 33.0 pg Final     Platelet Count   Date Value Ref Range Status   06/26/2025 269 150 - 450 10e3/uL Final     % Lymphocytes   Date Value Ref Range Status   01/30/2025 21 % Final     AST   Date Value Ref Range  Status   04/02/2025 27 0 - 45 U/L Final     ALT   Date Value Ref Range Status   06/26/2025 33 0 - 50 U/L Final     Albumin   Date Value Ref Range Status   06/26/2025 4.2 3.5 - 5.2 g/dL Final   11/12/2019 4.1 3.5 - 5.0 g/dL Final     Alkaline Phosphatase   Date Value Ref Range Status   04/02/2025 112 40 - 150 U/L Final     Creatinine   Date Value Ref Range Status   06/26/2025 0.91 0.51 - 0.95 mg/dL Final     GFR Estimate   Date Value Ref Range Status   06/26/2025 71 >60 mL/min/1.73m2 Final     Comment:     eGFR calculated using 2021 CKD-EPI equation.   11/03/2020 >60 >60 mL/min/1.73m2 Final     GFR Estimate If Black   Date Value Ref Range Status   11/03/2020 >60 >60 mL/min/1.73m2 Final     Erythrocyte Sedimentation Rate   Date Value Ref Range Status   02/06/2025 37 (H) 0 - 30 mm/hr Final

## 2025-07-14 ENCOUNTER — APPOINTMENT (OUTPATIENT)
Dept: CT IMAGING | Facility: HOSPITAL | Age: 65
End: 2025-07-14
Attending: EMERGENCY MEDICINE
Payer: COMMERCIAL

## 2025-07-14 ENCOUNTER — APPOINTMENT (OUTPATIENT)
Dept: ULTRASOUND IMAGING | Facility: HOSPITAL | Age: 65
End: 2025-07-14
Attending: EMERGENCY MEDICINE
Payer: COMMERCIAL

## 2025-07-14 ENCOUNTER — HOSPITAL ENCOUNTER (EMERGENCY)
Facility: HOSPITAL | Age: 65
Discharge: HOME | End: 2025-07-14
Attending: EMERGENCY MEDICINE | Admitting: EMERGENCY MEDICINE
Payer: COMMERCIAL

## 2025-07-14 ENCOUNTER — PATIENT OUTREACH (OUTPATIENT)
Dept: ONCOLOGY | Facility: CLINIC | Age: 65
End: 2025-07-14

## 2025-07-14 VITALS
RESPIRATION RATE: 20 BRPM | TEMPERATURE: 97.9 F | DIASTOLIC BLOOD PRESSURE: 67 MMHG | OXYGEN SATURATION: 94 % | SYSTOLIC BLOOD PRESSURE: 144 MMHG | BODY MASS INDEX: 41.64 KG/M2 | WEIGHT: 235 LBS | HEIGHT: 63 IN | HEART RATE: 86 BPM

## 2025-07-14 DIAGNOSIS — N83.8 OVARIAN MASS: ICD-10-CM

## 2025-07-14 DIAGNOSIS — R55 NEAR SYNCOPE: ICD-10-CM

## 2025-07-14 DIAGNOSIS — N39.0 URINARY TRACT INFECTION WITHOUT HEMATURIA, SITE UNSPECIFIED: ICD-10-CM

## 2025-07-14 LAB
ALBUMIN SERPL BCG-MCNC: 4.1 G/DL (ref 3.5–5.2)
ALBUMIN UR-MCNC: 10 MG/DL
ALP SERPL-CCNC: 102 U/L (ref 40–150)
ALT SERPL W P-5'-P-CCNC: 34 U/L (ref 0–50)
ANION GAP SERPL CALCULATED.3IONS-SCNC: 9 MMOL/L (ref 7–15)
APPEARANCE UR: CLEAR
AST SERPL W P-5'-P-CCNC: 29 U/L (ref 0–45)
BACTERIA #/AREA URNS HPF: ABNORMAL /HPF
BASOPHILS # BLD AUTO: 0.1 10E3/UL (ref 0–0.2)
BASOPHILS NFR BLD AUTO: 1 %
BILIRUB SERPL-MCNC: 0.3 MG/DL
BILIRUB UR QL STRIP: NEGATIVE
BUN SERPL-MCNC: 21.9 MG/DL (ref 8–23)
CALCIUM SERPL-MCNC: 9.3 MG/DL (ref 8.8–10.4)
CHLORIDE SERPL-SCNC: 104 MMOL/L (ref 98–107)
COLOR UR AUTO: YELLOW
CREAT SERPL-MCNC: 0.87 MG/DL (ref 0.51–0.95)
D DIMER PPP FEU-MCNC: 0.63 UG/ML FEU (ref 0–0.5)
EGFRCR SERPLBLD CKD-EPI 2021: 75 ML/MIN/1.73M2
EOSINOPHIL # BLD AUTO: 0.1 10E3/UL (ref 0–0.7)
EOSINOPHIL NFR BLD AUTO: 1 %
ERYTHROCYTE [DISTWIDTH] IN BLOOD BY AUTOMATED COUNT: 14.6 % (ref 10–15)
GLUCOSE SERPL-MCNC: 117 MG/DL (ref 70–99)
GLUCOSE UR STRIP-MCNC: NEGATIVE MG/DL
HCO3 SERPL-SCNC: 28 MMOL/L (ref 22–29)
HCT VFR BLD AUTO: 39.3 % (ref 35–47)
HGB BLD-MCNC: 12 G/DL (ref 11.7–15.7)
HGB UR QL STRIP: NEGATIVE
IMM GRANULOCYTES # BLD: 0 10E3/UL
IMM GRANULOCYTES NFR BLD: 0 %
KETONES UR STRIP-MCNC: NEGATIVE MG/DL
LACTATE SERPL-SCNC: 1.5 MMOL/L (ref 0.7–2)
LEUKOCYTE ESTERASE UR QL STRIP: ABNORMAL
LIPASE SERPL-CCNC: 26 U/L (ref 13–60)
LYMPHOCYTES # BLD AUTO: 1 10E3/UL (ref 0.8–5.3)
LYMPHOCYTES NFR BLD AUTO: 11 %
MAGNESIUM SERPL-MCNC: 2.3 MG/DL (ref 1.7–2.3)
MCH RBC QN AUTO: 28.6 PG (ref 26.5–33)
MCHC RBC AUTO-ENTMCNC: 30.5 G/DL (ref 31.5–36.5)
MCV RBC AUTO: 94 FL (ref 78–100)
MONOCYTES # BLD AUTO: 0.3 10E3/UL (ref 0–1.3)
MONOCYTES NFR BLD AUTO: 3 %
MUCOUS THREADS #/AREA URNS LPF: PRESENT /LPF
NEUTROPHILS # BLD AUTO: 7.5 10E3/UL (ref 1.6–8.3)
NEUTROPHILS NFR BLD AUTO: 84 %
NITRATE UR QL: NEGATIVE
NRBC # BLD AUTO: 0 10E3/UL
NRBC BLD AUTO-RTO: 0 /100
NT-PROBNP SERPL-MCNC: <36 PG/ML (ref 0–226)
PH UR STRIP: 5.5 [PH] (ref 5–7)
PLATELET # BLD AUTO: 257 10E3/UL (ref 150–450)
POTASSIUM SERPL-SCNC: 4.6 MMOL/L (ref 3.4–5.3)
PROT SERPL-MCNC: 7.5 G/DL (ref 6.4–8.3)
RADIOLOGIST FLAGS: ABNORMAL
RADIOLOGIST FLAGS: ABNORMAL
RBC # BLD AUTO: 4.19 10E6/UL (ref 3.8–5.2)
RBC URINE: 1 /HPF
SODIUM SERPL-SCNC: 141 MMOL/L (ref 135–145)
SP GR UR STRIP: 1.03 (ref 1–1.03)
SQUAMOUS EPITHELIAL: 7 /HPF
TROPONIN T SERPL HS-MCNC: 13 NG/L
TROPONIN T SERPL HS-MCNC: 13 NG/L
TSH SERPL DL<=0.005 MIU/L-ACNC: 1.29 UIU/ML (ref 0.3–4.2)
UROBILINOGEN UR STRIP-MCNC: NORMAL MG/DL
WBC # BLD AUTO: 9 10E3/UL (ref 4–11)
WBC URINE: 5 /HPF

## 2025-07-14 PROCEDURE — 84443 ASSAY THYROID STIM HORMONE: CPT | Performed by: EMERGENCY MEDICINE

## 2025-07-14 PROCEDURE — 83735 ASSAY OF MAGNESIUM: CPT | Performed by: EMERGENCY MEDICINE

## 2025-07-14 PROCEDURE — 74177 CT ABD & PELVIS W/CONTRAST: CPT

## 2025-07-14 PROCEDURE — 85025 COMPLETE CBC W/AUTO DIFF WBC: CPT | Performed by: EMERGENCY MEDICINE

## 2025-07-14 PROCEDURE — 80053 COMPREHEN METABOLIC PANEL: CPT | Performed by: EMERGENCY MEDICINE

## 2025-07-14 PROCEDURE — 96361 HYDRATE IV INFUSION ADD-ON: CPT

## 2025-07-14 PROCEDURE — 84484 ASSAY OF TROPONIN QUANT: CPT | Performed by: EMERGENCY MEDICINE

## 2025-07-14 PROCEDURE — 85379 FIBRIN DEGRADATION QUANT: CPT | Performed by: EMERGENCY MEDICINE

## 2025-07-14 PROCEDURE — 81003 URINALYSIS AUTO W/O SCOPE: CPT | Performed by: EMERGENCY MEDICINE

## 2025-07-14 PROCEDURE — 250N000011 HC RX IP 250 OP 636: Performed by: EMERGENCY MEDICINE

## 2025-07-14 PROCEDURE — 76856 US EXAM PELVIC COMPLETE: CPT

## 2025-07-14 PROCEDURE — 71275 CT ANGIOGRAPHY CHEST: CPT

## 2025-07-14 PROCEDURE — 83880 ASSAY OF NATRIURETIC PEPTIDE: CPT | Performed by: EMERGENCY MEDICINE

## 2025-07-14 PROCEDURE — 93005 ELECTROCARDIOGRAM TRACING: CPT | Performed by: EMERGENCY MEDICINE

## 2025-07-14 PROCEDURE — 83690 ASSAY OF LIPASE: CPT | Performed by: EMERGENCY MEDICINE

## 2025-07-14 PROCEDURE — 258N000003 HC RX IP 258 OP 636: Performed by: EMERGENCY MEDICINE

## 2025-07-14 PROCEDURE — 83605 ASSAY OF LACTIC ACID: CPT | Performed by: EMERGENCY MEDICINE

## 2025-07-14 PROCEDURE — 96365 THER/PROPH/DIAG IV INF INIT: CPT | Mod: 59

## 2025-07-14 PROCEDURE — 36415 COLL VENOUS BLD VENIPUNCTURE: CPT | Performed by: EMERGENCY MEDICINE

## 2025-07-14 PROCEDURE — 99285 EMERGENCY DEPT VISIT HI MDM: CPT | Mod: 25 | Performed by: EMERGENCY MEDICINE

## 2025-07-14 RX ORDER — CEPHALEXIN 500 MG/1
500 CAPSULE ORAL 3 TIMES DAILY
Qty: 21 CAPSULE | Refills: 0 | Status: SHIPPED | OUTPATIENT
Start: 2025-07-14 | End: 2025-07-21

## 2025-07-14 RX ORDER — IOPAMIDOL 755 MG/ML
90 INJECTION, SOLUTION INTRAVASCULAR ONCE
Status: COMPLETED | OUTPATIENT
Start: 2025-07-14 | End: 2025-07-14

## 2025-07-14 RX ORDER — CEFTRIAXONE 2 G/1
2 INJECTION, POWDER, FOR SOLUTION INTRAMUSCULAR; INTRAVENOUS ONCE
Status: COMPLETED | OUTPATIENT
Start: 2025-07-14 | End: 2025-07-14

## 2025-07-14 RX ADMIN — IOPAMIDOL 90 ML: 755 INJECTION, SOLUTION INTRAVENOUS at 13:40

## 2025-07-14 RX ADMIN — CEFTRIAXONE SODIUM 2 G: 2 INJECTION, POWDER, FOR SOLUTION INTRAMUSCULAR; INTRAVENOUS at 11:44

## 2025-07-14 RX ADMIN — IOPAMIDOL 90 ML: 755 INJECTION, SOLUTION INTRAVENOUS at 12:13

## 2025-07-14 RX ADMIN — SODIUM CHLORIDE 1000 ML: 0.9 INJECTION, SOLUTION INTRAVENOUS at 09:57

## 2025-07-14 ASSESSMENT — ACTIVITIES OF DAILY LIVING (ADL)
ADLS_ACUITY_SCORE: 41
ADLS_ACUITY_SCORE: 41
ADLS_ACUITY_SCORE: 47

## 2025-07-14 ASSESSMENT — ENCOUNTER SYMPTOMS
WEAKNESS: 1
ABDOMINAL PAIN: 0
SHORTNESS OF BREATH: 0
FATIGUE: 1
DIAPHORESIS: 1
DIARRHEA: 1
NAUSEA: 1

## 2025-07-14 ASSESSMENT — COLUMBIA-SUICIDE SEVERITY RATING SCALE - C-SSRS
6. HAVE YOU EVER DONE ANYTHING, STARTED TO DO ANYTHING, OR PREPARED TO DO ANYTHING TO END YOUR LIFE?: NO
2. HAVE YOU ACTUALLY HAD ANY THOUGHTS OF KILLING YOURSELF IN THE PAST MONTH?: NO
1. IN THE PAST MONTH, HAVE YOU WISHED YOU WERE DEAD OR WISHED YOU COULD GO TO SLEEP AND NOT WAKE UP?: NO

## 2025-07-14 NOTE — ED PROVIDER NOTES
EMERGENCY DEPARTMENT ENCOUNTER      NAME: Chante Tracy  AGE: 64 year old female  YOB: 1960  MRN: [id number]  EVALUATION DATE & TIME: 7/14/2025  9:10 AM    PCP: Maria Esther Ho    ED PROVIDER: Cecil Edmondson MD    Chief Complaint   Patient presents with    Fatigue    feeling fainting      FINAL IMPRESSION:  1. Near syncope    2. Urinary tract infection without hematuria, site unspecified    3. Ovarian mass      ED COURSE & MEDICAL DECISION MAKING:    Pertinent Labs & Imaging studies reviewed. (See chart for details)  64 year old female presents to the Emergency Department for evaluation of fatigue and feeling as though she is going to pass out.  Patient was at work today when she started to feel very faint and lightheaded as though she was going to pass out she reports being covered in sweat.  She was told she was pale by her coworkers.  Had associated nausea with this.  Subsequently EMS was contacted and the patient was brought to the emergency department for assessment.  She does report that for the past couple of days she has been having loose watery stools and she has been feeling weak.  She is on methotrexate.  They recently increased her dose a couple of months ago.  She also has a history of thyroid cancer and is status post thyroidectomy on thyroid supplementation she does not know when her last thyroid test was.    On examination patient was pale appearing had unremarkable vital signs.  Cardiopulmonary examination abdominal examination was benign.  ECG normal sinus rhythm and nonischemic appearing.  Patient's symptomatology improved quite quickly with institution of intravenous fluids.  He was feeling back to normal.  CBC was unremarkable.  Metabolic panel notable for blood glucose of 117.  TSH normal.  Troponins x 2 appropriate and gender adjusted normal.  D-dimer did return elevated and so recommended CT scan pulmonary angiogram.  Also noted to have a positive urinalysis that was  "concerning for infection so instituted antibiotics.  Decided to carry through of the abdomen pelvis patient's scan given her symptomatology that she was relating.  Received a call from radiology with concerning CT scan findings regarding a large left-sided ovarian mass with some characteristics on CT scan that could be concerning for torsion.  Subsequently went and updated the patient she does report a history of large ovarian cysts that she has had in the past.  Some of them \"grapefruit size\".  It has been a number of years though since she is dealt with that issue.  She has no abdominal pain at this time.  No current chest or respiratory symptoms or symptoms associate going to pass out.  Our plan of care at this time is for pelvic ultrasound for further assessment of patient's ovarian mass.  Disposition to be determined based on the ovarian results.  If no torsion evident on ultrasound and either benign or even if potentially malignant tumor I think patient ultimately could be discharged given her clinical improvement with close outpatient follow-up.  Reviewed that all with the patient as we currently await ultrasound.  Patient will be signed out at this point in care to oncoming physician.    At the conclusion of the encounter I discussed the results of all of the tests and the disposition. The questions were answered. The patient or family acknowledged understanding and was agreeable with the care plan.     Medical Decision Making  Admission considered. Patient was signed out to the oncoming physician, disposition pending.    MIPS (CTPE, Dental pain, Cherry, Sinusitis, Asthma/COPD, Head Trauma): CT Pulmonary Angiogram:The patient had an abnormal d-dimer.    SEPSIS: None        MEDICATIONS GIVEN IN THE EMERGENCY:  Medications   sodium chloride 0.9% BOLUS 1,000 mL (0 mLs Intravenous Stopped 7/14/25 2390)   cefTRIAXone (ROCEPHIN) 2 g vial to attach to  ml bag for ADULTS or NS 50 ml bag for PEDS (0 g " "Intravenous Stopped 7/14/25 1244)   iopamidol (ISOVUE-370) solution 90 mL (90 mLs Intravenous $Given 7/14/25 1213)   iopamidol (ISOVUE-370) solution 90 mL (90 mLs Intravenous $Given 7/14/25 1340)       NEW PRESCRIPTIONS STARTED AT TODAY'S ER VISIT  New Prescriptions    CEPHALEXIN (KEFLEX) 500 MG CAPSULE    Take 1 capsule (500 mg) by mouth 3 times daily for 7 days.          =================================================================    HPI    Patient information was obtained from: patient    Use of : N/A         Chante Tracy is a 64 year old female with a pertinent history of hyperlipidemia, GERD, HTN, obesity, hypoparathyroidism, Crohn disease, RLS, and total thyroidectomy who presents to this ED via EMS for evaluation of fatigue and feeling faint.    Patient was at work today when she started feeling diaphoretic and faint. She states that she \"felt the color drain out of her\" and EMS was called. She reports feeling weak and nauseous, however, she notes that the nausea is gone at evaluation. Patient reports feeling \"exhausted\" for the past 6 days (07/08/2025), and she notes that she experienced a headache and diarrhea 3x/day over this period. She states that the methotrexate makes the diarrhea worse and that the dose was changed 2 months ago. She notes that she does not have her thyroid anymore.     Patient denies shortness of breath, abdominal pain, or chest pain.    Per chart review, patient was seen a [facility] on 05/16/2025 for an US of the thyroid and parathyroid. Visit diagnosis was excessive daytime sleepiness. Medication changes include: discontinuing 500mg disalcid, 100mg gabapentin, and 10mg flexeril.    REVIEW OF SYSTEMS   Review of Systems   Constitutional:  Positive for diaphoresis and fatigue.   Respiratory:  Negative for shortness of breath.    Cardiovascular:  Negative for chest pain.   Gastrointestinal:  Positive for diarrhea and nausea. " Negative for abdominal pain.   Neurological:  Positive for weakness.        PAST MEDICAL HISTORY:  No past medical history on file.    PAST SURGICAL HISTORY:  No past surgical history on file.        CURRENT MEDICATIONS:    cephALEXin (KEFLEX) 500 MG capsule  amitriptyline (ELAVIL) 10 MG tablet  calcium citrate-vitamin D (CITRACAL) 200-6.25 MG-MCG TABS per tablet  cholecalciferol (VITAMIN D3) 5000 units (125 mcg) capsule  cyclobenzaprine (FLEXERIL) 10 MG tablet  folic acid (FOLVITE) 1 MG tablet  levothyroxine (SYNTHROID/LEVOTHROID) 200 MCG tablet  levothyroxine (SYNTHROID/LEVOTHROID) 25 MCG tablet  loperamide (IMODIUM A-D) 2 MG tablet  methotrexate 2.5 MG tablet  multivitamin w/minerals (MULTI-VITAMIN) tablet  omeprazole (PRILOSEC) 20 MG DR capsule  pramipexole (MIRAPEX) 0.125 MG tablet  pramipexole (MIRAPEX) 0.5 MG tablet  salsalate (DISALCID) 500 MG tablet  SPIRIVA RESPIMAT 2.5 MCG/ACT inhaler  tiZANidine (ZANAFLEX) 2 MG tablet  valsartan (DIOVAN) 160 MG tablet        ALLERGIES:  Allergies   Allergen Reactions    Celecoxib Hives and Nausea and Vomiting    Hydrochlorothiazide-Triamterene Hives     Nose bleeds    Indapamide Hives    Lisinopril Hives    Penicillins Other (See Comments) and Hives     Family hx    Simvastatin Hives    Bacitracin Other (See Comments)     Eats away skin    Belladonna GI Disturbance     Can't digest    Ethacrynic Acid Other (See Comments)     Slept all day    Fish Allergy GI Disturbance     Can't digest    Gabapentin Other (See Comments)     Unsteady feet    Hydrochlorothiazide Other (See Comments)    Ibuprofen Other (See Comments)     Causes ulcers   Eats lining of stomach    Causes ulcers    Triamterene Hives     Nose bleeds    Budesonide-Formoterol Fumarate Rash    Diazepam Other (See Comments) and Palpitations     Heart racing    Menthol Rash     Icy Hot patch    Neomycin Rash    Neomycin-Bacitracin Zn-Polymyx Rash     Eats away skin    Polymyxin B Rash     Eats away skin    Sulfa  "Antibiotics Hives and Other (See Comments)     Family hx       FAMILY HISTORY:  Family History   Problem Relation Age of Onset    Breast Cancer No family hx of        SOCIAL HISTORY:   Social History     Socioeconomic History    Marital status: Single   Tobacco Use    Smoking status: Former     Types: Cigarettes     Social Drivers of Health      Received from [facility]       VITALS:  /66   Pulse 83   Temp 97.9  F (36.6  C) (Oral)   Resp 16   Ht 1.6 m (5' 3\")   Wt 106.6 kg (235 lb)   SpO2 96%   BMI 41.63 kg/m      PHYSICAL EXAM   Constitutional: Well developed, Well nourished, NAD  HENT: Normocephalic, Atraumatic, Bilateral external ears normal, Oropharynx normal, mucous membranes moist, Nose normal. Neck-  Normal range of motion, No tenderness, Supple, No stridor.   Eyes: PERRL, EOMI, Conjunctiva normal, No discharge.   Respiratory: Normal breath sounds, No respiratory distress, No wheezing, Speaks full sentences easily. No cough.   Cardiovascular: Normal heart rate, Regular rhythm, No murmurs Chest wall nontender.    GI:  Soft, No tenderness, No masses, No flank tenderness. No rebound or guarding.  : No cva tenderness    Musculoskeletal: 2+ DP pulses. No edema. No cyanosis. Good range of motion in all major joints. No tenderness to palpation. No tenderness of the CTLS spine.   Integument: Warm, Dry, No erythema, No rash. No petechiae.   NEURO:     MENTAL STATUS: AAOx3    LANG/SPEECH: Fluent, intact naming, repetition & comprehension    CRANIAL NERVES:    II: Pupils equal and reactive, normal visual field     III, IV, VI: EOM intact, no gaze preference or deviation    V: normal    VII: no facial asymmetry    VIII: normal hearing to speech    MOTOR: 5/5 in both upper and lower extremities    SENSORY: Normal to touch, temperature & pin prick in all extremiteis    COORD: No dysmetria, able to ambulate  Psychiatric: Affect normal, Judgment normal, " Mood normal. Cooperative.       LAB:  All pertinent labs reviewed and interpreted.  Results for orders placed or performed during the hospital encounter of 07/14/25   CT Chest Pulmonary Embolism w Contrast   Result Value Ref Range    Radiologist flags Suspected ovarian or testicular torsion (AA)     Impression    IMPRESSION:    1.  No acute pulmonary embolism.  2.  Emphysema, but no superimposed acute lung, airway, or pleural inflammatory process.  3.  Large, greater than 10 cm intermediate attenuation lesion arising from the left ovary likely a large hemorrhagic cyst. The adjacent ovarian parenchyma is enlarged and heterogeneous, concerning for ovarian torsion. Correlation with pelvic ultrasound   is suggested.      [Critical Result:  Suspected ovarian or testicular torsion]    Finding was identified on 7/14/2025 12:31 PM CDT.     Dr. Branham was contacted by me on 7/14/2025 1:10 PM CDT and verbalized understanding of the critical result.   CT Abdomen Pelvis w Contrast   Result Value Ref Range    Radiologist flags Suspected ovarian or testicular torsion (AA)     Impression    IMPRESSION:    1.  No acute pulmonary embolism.  2.  Emphysema, but no superimposed acute lung, airway, or pleural inflammatory process.  3.  Large, greater than 10 cm intermediate attenuation lesion arising from the left ovary likely a large hemorrhagic cyst. The adjacent ovarian parenchyma is enlarged and heterogeneous, concerning for ovarian torsion. Correlation with pelvic ultrasound   is suggested.      [Critical Result:  Suspected ovarian or testicular torsion]    Finding was identified on 7/14/2025 12:31 PM CDT.     Dr. Branham was contacted by me on 7/14/2025 1:10 PM CDT and verbalized understanding of the critical result.   D dimer quantitative   Result Value Ref Range    D-Dimer Quantitative 0.63 (H) 0.00 - 0.50 ug/mL FEU   Comprehensive metabolic panel   Result Value Ref Range    Sodium 141 135 - 145 mmol/L    Potassium 4.6 3.4 -  5.3 mmol/L    Carbon Dioxide (CO2) 28 22 - 29 mmol/L    Anion Gap 9 7 - 15 mmol/L    Urea Nitrogen 21.9 8.0 - 23.0 mg/dL    Creatinine 0.87 0.51 - 0.95 mg/dL    GFR Estimate 75 >60 mL/min/1.73m2    Calcium 9.3 8.8 - 10.4 mg/dL    Chloride 104 98 - 107 mmol/L    Glucose 117 (H) 70 - 99 mg/dL    Alkaline Phosphatase 102 40 - 150 U/L    AST 29 0 - 45 U/L    ALT 34 0 - 50 U/L    Protein Total 7.5 6.4 - 8.3 g/dL    Albumin 4.1 3.5 - 5.2 g/dL    Bilirubin Total 0.3 <=1.2 mg/dL   Result Value Ref Range    Lipase 26 13 - 60 U/L   Result Value Ref Range    Troponin T, High Sensitivity 13 <=14 ng/L   Result Value Ref Range    Magnesium 2.3 1.7 - 2.3 mg/dL   TSH with free T4 reflex   Result Value Ref Range    TSH 1.29 0.30 - 4.20 uIU/mL   NT-proBNP   Result Value Ref Range    NT-proBNP <36 0 - 226 pg/mL   UA with Microscopic reflex to Culture    Specimen: Urine, Clean Catch   Result Value Ref Range    Color Urine Yellow Colorless, Straw, Light Yellow, Yellow    Appearance Urine Clear Clear    Glucose Urine Negative Negative mg/dL    Bilirubin Urine Negative Negative    Ketones Urine Negative Negative mg/dL    Specific Gravity Urine 1.027 1.001 - 1.030    Blood Urine Negative Negative    pH Urine 5.5 5.0 - 7.0    Protein Albumin Urine 10 (A) Negative mg/dL    Urobilinogen Urine Normal Normal mg/dL    Nitrite Urine Negative Negative    Leukocyte Esterase Urine 75 Parviz/uL (A) Negative    Bacteria Urine Many (A) None Seen /HPF    Mucus Urine Present (A) None Seen /LPF    RBC Urine 1 <=2 /HPF    WBC Urine 5 <=5 /HPF    Squamous Epithelials Urine 7 (H) <=1 /HPF   Lactic acid whole blood with 1x repeat in 2 hr when >2   Result Value Ref Range    Lactic Acid, Initial 1.5 0.7 - 2.0 mmol/L   CBC with platelets and differential   Result Value Ref Range    WBC Count 9.0 4.0 - 11.0 10e3/uL    RBC Count 4.19 3.80 - 5.20 10e6/uL    Hemoglobin 12.0 11.7 - 15.7 g/dL    Hematocrit 39.3 35.0 - 47.0 %    MCV 94 78 - 100 fL    MCH 28.6 26.5 - 33.0  pg    MCHC 30.5 (L) 31.5 - 36.5 g/dL    RDW 14.6 10.0 - 15.0 %    Platelet Count 257 150 - 450 10e3/uL    % Neutrophils 84 %    % Lymphocytes 11 %    % Monocytes 3 %    % Eosinophils 1 %    % Basophils 1 %    % Immature Granulocytes 0 %    NRBCs per 100 WBC 0 <1 /100    Absolute Neutrophils 7.5 1.6 - 8.3 10e3/uL    Absolute Lymphocytes 1.0 0.8 - 5.3 10e3/uL    Absolute Monocytes 0.3 0.0 - 1.3 10e3/uL    Absolute Eosinophils 0.1 0.0 - 0.7 10e3/uL    Absolute Basophils 0.1 0.0 - 0.2 10e3/uL    Absolute Immature Granulocytes 0.0 <=0.4 10e3/uL    Absolute NRBCs 0.0 10e3/uL   Result Value Ref Range    Troponin T, High Sensitivity 13 <=14 ng/L       RADIOLOGY:  Reviewed all pertinent imaging. Please see official radiology report.  CT Chest Pulmonary Embolism w Contrast   Final Result   Abnormal   IMPRESSION:      1.  No acute pulmonary embolism.   2.  Emphysema, but no superimposed acute lung, airway, or pleural inflammatory process.   3.  Large, greater than 10 cm intermediate attenuation lesion arising from the left ovary likely a large hemorrhagic cyst. The adjacent ovarian parenchyma is enlarged and heterogeneous, concerning for ovarian torsion. Correlation with pelvic ultrasound    is suggested.         [Critical Result:  Suspected ovarian or testicular torsion]      Finding was identified on 7/14/2025 12:31 PM CDT.       Dr. Branham was contacted by me on 7/14/2025 1:10 PM CDT and verbalized understanding of the critical result.      CT Abdomen Pelvis w Contrast   Final Result   Abnormal   IMPRESSION:      1.  No acute pulmonary embolism.   2.  Emphysema, but no superimposed acute lung, airway, or pleural inflammatory process.   3.  Large, greater than 10 cm intermediate attenuation lesion arising from the left ovary likely a large hemorrhagic cyst. The adjacent ovarian parenchyma is enlarged and heterogeneous, concerning for ovarian torsion. Correlation with pelvic ultrasound    is suggested.         [Critical  Result:  Suspected ovarian or testicular torsion]      Finding was identified on 7/14/2025 12:31 PM CDT.       Dr. Branham was contacted by me on 7/14/2025 1:10 PM CDT and verbalized understanding of the critical result.      US Pelvic Complete with Transvaginal    (Results Pending)       EKG:    Performed at: 14-July-2025 10:19    Impression: Normal sinus rhythm ST segments per nonischemic    Rate: 85 bpm  Rhythm: Normal sinus rhythm  Axis: 61, 57, 65  WV Interval: 196 ms  QRS Interval: 84 ms  QTc Interval: 372/442 ms  ST Changes: None  Comparison: No previous ECGs available    I have independently reviewed and interpreted the EKG(s) documented above.      I, Jenna Koehler, am serving as a scribe to document services personally performed by Cecil Edmondson MD, based on my observation and the provider's statements to me. I, Cecil Edmondson MD, attest that Jenna Koehler is acting in a scribe capacity, has observed my performance of the services and has documented them in accordance with my direction.    Cecil Edmondson MD  [facility] EMERGENCY DEPARTMENT  [address] [place] [id number]  [phone]       Cecil Edmondson MD  07/14/25 3037

## 2025-07-14 NOTE — ED TRIAGE NOTES
"Patient brought by medic coming from work ; today feels like not well \" feels like fainting , nausea, light head, diarrhea,tired .  Blood sugar en route 146 .  Hx: RA        "

## 2025-07-14 NOTE — PROGRESS NOTES
New Patient Hematology / Oncology Nurse Navigator Note     Referral Date: 7/14/25    Referring provider:   Jeff Mackay MD   [address]   [facility] [address]   Phone: [phone]   Fax: [phone]       Referring Clinic/Organization: [facility]     Referred to: [facility]yn[facility]    Requested provider (if applicable): First available - did not specify     Evaluation for : Ovarian mass    Clinical History (per Nurse review of records provided):    ED Visit today at [facility] -- BOOKMARKED   -- BOOKMARKED  7/14/25 Pelvic US:  IMPRESSION:    1.  Large pelvic mass. Review of CT favors left adnexal origin. Likely a cystic ovarian tumor.  2.  No ultrasound findings concerning for torsion although neither ovary is clearly identified on the ultrasound.  7/14/25 CT Abd/Pelvis:  IMPRESSION:   1.  No acute pulmonary embolism.  2.  Emphysema, but no superimposed acute lung, airway, or pleural inflammatory process.  3.  Large, greater than 10 cm intermediate attenuation lesion arising from the left ovary likely a large hemorrhagic cyst. The adjacent ovarian parenchyma is enlarged and heterogeneous, concerning for ovarian torsion. Correlation with pelvic ultrasound   is suggested. -- BOOKMARKED      Clinical Assessment / Barriers to Care (Per Nurse):  Pt lives in [place], [place]    Records Location: [facility]     Records Needed:   N/A    Additional testing needed prior to consult:   N/A    Referral updates and Plan:   Referral received, chart reviewed by nursing, tentative hold placed on Dr. Tapia's schedule 7/16 10AM @ [facility]    Will await discharge from [facility] ED then reach out to patient to arrange outpatient GynOnc follow-up as requested.     Addendum 7/15/25 0915: Pt discharged from ED.     OUTGOING CALL to pt, no answer.     LVM introducing my role as nurse navigator with [facility] and that we have recd the referral to GynOnc for follow-up after her ED visit yesterday.     Explained to pt that he/she will receive a call  from our scheduling intake team and provided our call-back number below if needed. Will offer visit tomorrow with Dr. Tapia or next opening per pt location preference.        Albina Alvarez, BSN, RN, PHN, OCN  Hematology/Oncology Nurse Navigator  [facility] [place] Emergency Department  [phone]
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
## 2025-07-14 NOTE — DISCHARGE INSTRUCTIONS
Take antibiotics as prescribed.  Urine culture is pending.  Please follow-up with your primary care doctor in the next couple days for recheck and follow-up to Emergency Department visit.  As we discussed there is an abnormality on your CT scan demonstrating a mass in the ovary that will need further workup on outpatient basis.  If any escalation to your symptoms or develop additional concern return to emergency department repeat assessment.

## 2025-07-14 NOTE — ED NOTES
"ED SIGNOUT  Date/Time:7/14/2025 2:41 PM    ED Course/MDM:    2:31 PM Signout accepted from Dr. Edmondson.  Prior records were reviewed.  Labs, x-ray, CT, EKG from this visit are reviewed.    ED Course as of 07/14/25 1559   Mon Jul 14, 2025   1538 IMPRESSION:    1.  Large pelvic mass. Review of CT favors left adnexal origin. Likely a cystic ovarian tumor.     2.  No ultrasound findings concerning for torsion although neither ovary is clearly identified on the ultrasound.       I discussed with patient the utility, limitations and findings of the exam/interventions/studies done during this visit as well as the list of differential diagnosis and symptoms to monitor/return to ER for.  Additional verbal discharge instructions were provided.     DIAGNOSIS  1. Near syncope    2. Urinary tract infection without hematuria, site unspecified    3. Ovarian mass        New Prescriptions    CEPHALEXIN (KEFLEX) 500 MG CAPSULE    Take 1 capsule (500 mg) by mouth 3 times daily for 7 days.       HPI    Briefly, this is a 64 year old female who presents to the ED for evaluation of fatigue and feeling like she was going to faint. The patient reports that she was at work today (07/14/2025) when she started feeling diaphoretic and faint-like. Notes that she \"felt the color drain out of [her]\" and EMS was called. She reports feeling weak and nauseated, however, she notes that the nausea is gone when she was evaluated here in the ED. The patient reports feeling \"exhausted\" for the past 6 days (starting on 07/08/2025), and mentions that she experienced a headache and diarrhea 3x/day over this period of time. She indicates that the methotrexate makes the diarrhea worse and that the dose of this medication was changed 2 months ago. States that she does not have her thyroid anymore. She denies experiencing any shortness of breath, abdominal pain, or chest pain.      Physical Exam:  /69   Pulse 88   Temp 97.9  F (36.6  C) (Oral)   Resp " "16   Ht 1.6 m (5' 3\")   Wt 106.6 kg (235 lb)   SpO2 96%   BMI 41.63 kg/m    Physical Exam     Results for orders placed or performed during the hospital encounter of 07/14/25   CT Chest Pulmonary Embolism w Contrast   Result Value Ref Range    Radiologist flags Suspected ovarian or testicular torsion (AA)     Impression    IMPRESSION:    1.  No acute pulmonary embolism.  2.  Emphysema, but no superimposed acute lung, airway, or pleural inflammatory process.  3.  Large, greater than 10 cm intermediate attenuation lesion arising from the left ovary likely a large hemorrhagic cyst. The adjacent ovarian parenchyma is enlarged and heterogeneous, concerning for ovarian torsion. Correlation with pelvic ultrasound   is suggested.      [Critical Result:  Suspected ovarian or testicular torsion]    Finding was identified on 7/14/2025 12:31 PM CDT.     Dr. Branham was contacted by me on 7/14/2025 1:10 PM CDT and verbalized understanding of the critical result.   CT Abdomen Pelvis w Contrast   Result Value Ref Range    Radiologist flags Suspected ovarian or testicular torsion (AA)     Impression    IMPRESSION:    1.  No acute pulmonary embolism.  2.  Emphysema, but no superimposed acute lung, airway, or pleural inflammatory process.  3.  Large, greater than 10 cm intermediate attenuation lesion arising from the left ovary likely a large hemorrhagic cyst. The adjacent ovarian parenchyma is enlarged and heterogeneous, concerning for ovarian torsion. Correlation with pelvic ultrasound   is suggested.      [Critical Result:  Suspected ovarian or testicular torsion]    Finding was identified on 7/14/2025 12:31 PM CDT.     Dr. Branham was contacted by me on 7/14/2025 1:10 PM CDT and verbalized understanding of the critical result.   D dimer quantitative   Result Value Ref Range    D-Dimer Quantitative 0.63 (H) 0.00 - 0.50 ug/mL FEU   Comprehensive metabolic panel   Result Value Ref Range    Sodium 141 135 - 145 mmol/L    " Potassium 4.6 3.4 - 5.3 mmol/L    Carbon Dioxide (CO2) 28 22 - 29 mmol/L    Anion Gap 9 7 - 15 mmol/L    Urea Nitrogen 21.9 8.0 - 23.0 mg/dL    Creatinine 0.87 0.51 - 0.95 mg/dL    GFR Estimate 75 >60 mL/min/1.73m2    Calcium 9.3 8.8 - 10.4 mg/dL    Chloride 104 98 - 107 mmol/L    Glucose 117 (H) 70 - 99 mg/dL    Alkaline Phosphatase 102 40 - 150 U/L    AST 29 0 - 45 U/L    ALT 34 0 - 50 U/L    Protein Total 7.5 6.4 - 8.3 g/dL    Albumin 4.1 3.5 - 5.2 g/dL    Bilirubin Total 0.3 <=1.2 mg/dL   Result Value Ref Range    Lipase 26 13 - 60 U/L   Result Value Ref Range    Troponin T, High Sensitivity 13 <=14 ng/L   Result Value Ref Range    Magnesium 2.3 1.7 - 2.3 mg/dL   TSH with free T4 reflex   Result Value Ref Range    TSH 1.29 0.30 - 4.20 uIU/mL   NT-proBNP   Result Value Ref Range    NT-proBNP <36 0 - 226 pg/mL   UA with Microscopic reflex to Culture    Specimen: Urine, Clean Catch   Result Value Ref Range    Color Urine Yellow Colorless, Straw, Light Yellow, Yellow    Appearance Urine Clear Clear    Glucose Urine Negative Negative mg/dL    Bilirubin Urine Negative Negative    Ketones Urine Negative Negative mg/dL    Specific Gravity Urine 1.027 1.001 - 1.030    Blood Urine Negative Negative    pH Urine 5.5 5.0 - 7.0    Protein Albumin Urine 10 (A) Negative mg/dL    Urobilinogen Urine Normal Normal mg/dL    Nitrite Urine Negative Negative    Leukocyte Esterase Urine 75 Parviz/uL (A) Negative    Bacteria Urine Many (A) None Seen /HPF    Mucus Urine Present (A) None Seen /LPF    RBC Urine 1 <=2 /HPF    WBC Urine 5 <=5 /HPF    Squamous Epithelials Urine 7 (H) <=1 /HPF   Lactic acid whole blood with 1x repeat in 2 hr when >2   Result Value Ref Range    Lactic Acid, Initial 1.5 0.7 - 2.0 mmol/L   CBC with platelets and differential   Result Value Ref Range    WBC Count 9.0 4.0 - 11.0 10e3/uL    RBC Count 4.19 3.80 - 5.20 10e6/uL    Hemoglobin 12.0 11.7 - 15.7 g/dL    Hematocrit 39.3 35.0 - 47.0 %    MCV 94 78 - 100 fL     MCH 28.6 26.5 - 33.0 pg    MCHC 30.5 (L) 31.5 - 36.5 g/dL    RDW 14.6 10.0 - 15.0 %    Platelet Count 257 150 - 450 10e3/uL    % Neutrophils 84 %    % Lymphocytes 11 %    % Monocytes 3 %    % Eosinophils 1 %    % Basophils 1 %    % Immature Granulocytes 0 %    NRBCs per 100 WBC 0 <1 /100    Absolute Neutrophils 7.5 1.6 - 8.3 10e3/uL    Absolute Lymphocytes 1.0 0.8 - 5.3 10e3/uL    Absolute Monocytes 0.3 0.0 - 1.3 10e3/uL    Absolute Eosinophils 0.1 0.0 - 0.7 10e3/uL    Absolute Basophils 0.1 0.0 - 0.2 10e3/uL    Absolute Immature Granulocytes 0.0 <=0.4 10e3/uL    Absolute NRBCs 0.0 10e3/uL   Result Value Ref Range    Troponin T, High Sensitivity 13 <=14 ng/L       CT Chest Pulmonary Embolism w Contrast  Result Date: 7/14/2025  EXAM: CT CHEST PULMONARY EMBOLISM W CONTRAST, CT ABDOMEN PELVIS W CONTRAST LOCATION: [facility] DATE: 7/14/2025 INDICATION: Near syncopal symptoms, elevated D dimer; nausea, urinary tract infection, abdominal pain COMPARISON: None. TECHNIQUE: CT angiogram chest and routine CT abdomen pelvis with IV contrast. Arterial phase through the chest and venous phase through the abdomen and pelvis. 2D and 3D MIP reconstructions performed by the CT technologist. CT abdomen and pelvis were also performed in the portal venous phase of contrast enhancement. Dose reduction techniques were used. CONTRAST: IsoVue 370 90mL FINDINGS: ANGIOGRAM CHEST: Main pulmonary artery is normal in size. There are no pulmonary artery filling defects. Thoracic aorta is negative for dissection. No CT evidence of right heart strain. LUNGS AND PLEURA: Upper lung predominant emphysema. There are numerous 2 to 3 mm calcified nodules in both lungs with a lower lung predominance, consistent with sequela of previous viral pneumonia. No airspace consolidation or interlobular septal thickening. Trachea and central airways are patent. There is no pleural effusion. MEDIASTINUM: Cardiac chambers are normal in size.  No pericardial effusion. No enlarged mediastinal or hilar lymph nodes. Esophagus is decompressed. No thyroid nodules. CORONARY ARTERY CALCIFICATION: Mild. HEPATOBILIARY: A few circumscribed simple fluid attenuation structures in the liver consistent with benign cysts. The liver is normal in size and has a smooth contour. Gallbladder and bile ducts are normal. PANCREAS: Normal. SPLEEN: Normal. ADRENAL GLANDS: Normal. KIDNEYS/BLADDER: No significant mass, stones, or hydronephrosis. There are simple or benign cysts. No follow up is needed. BOWEL: Few sigmoid colonic diverticula are present. No dilated bowel or bowel wall thickening. LYMPH NODES: Normal. VASCULATURE: Normal caliber abdominal aorta and iliac arteries with only minimal patchy atheromatous calcifications. PELVIC ORGANS: There is a 10.1 x 10.9 cm intermediate attenuation lesion arising from the left ovary (series 3, image 164) consistent with a hemorrhagic cyst. The left ovary is enlarged with heterogeneous attenuation including several peripheral fluid attenuation spaces. There is free fluid in the left adnexa. The right ovary is normal. Uterus is normal. MUSCULOSKELETAL: There are degenerative osteophytes near the thoracolumbar junction and lumbosacral facet arthropathy.     IMPRESSION: 1.  No acute pulmonary embolism. 2.  Emphysema, but no superimposed acute lung, airway, or pleural inflammatory process. 3.  Large, greater than 10 cm intermediate attenuation lesion arising from the left ovary likely a large hemorrhagic cyst. The adjacent ovarian parenchyma is enlarged and heterogeneous, concerning for ovarian torsion. Correlation with pelvic ultrasound is suggested. [Critical Result:  Suspected ovarian or testicular torsion] Finding was identified on 7/14/2025 12:31 PM CDT. Dr. Branham was contacted by me on 7/14/2025 1:10 PM CDT and verbalized understanding of the critical result.    CT Abdomen Pelvis w Contrast  Result Date: 7/14/2025  EXAM: CT CHEST  PULMONARY EMBOLISM W CONTRAST, CT ABDOMEN PELVIS W CONTRAST LOCATION: [facility] DATE: 7/14/2025 INDICATION: Near syncopal symptoms, elevated D dimer; nausea, urinary tract infection, abdominal pain COMPARISON: None. TECHNIQUE: CT angiogram chest and routine CT abdomen pelvis with IV contrast. Arterial phase through the chest and venous phase through the abdomen and pelvis. 2D and 3D MIP reconstructions performed by the CT technologist. CT abdomen and pelvis were also performed in the portal venous phase of contrast enhancement. Dose reduction techniques were used. CONTRAST: IsoVue 370 90mL FINDINGS: ANGIOGRAM CHEST: Main pulmonary artery is normal in size. There are no pulmonary artery filling defects. Thoracic aorta is negative for dissection. No CT evidence of right heart strain. LUNGS AND PLEURA: Upper lung predominant emphysema. There are numerous 2 to 3 mm calcified nodules in both lungs with a lower lung predominance, consistent with sequela of previous viral pneumonia. No airspace consolidation or interlobular septal thickening. Trachea and central airways are patent. There is no pleural effusion. MEDIASTINUM: Cardiac chambers are normal in size. No pericardial effusion. No enlarged mediastinal or hilar lymph nodes. Esophagus is decompressed. No thyroid nodules. CORONARY ARTERY CALCIFICATION: Mild. HEPATOBILIARY: A few circumscribed simple fluid attenuation structures in the liver consistent with benign cysts. The liver is normal in size and has a smooth contour. Gallbladder and bile ducts are normal. PANCREAS: Normal. SPLEEN: Normal. ADRENAL GLANDS: Normal. KIDNEYS/BLADDER: No significant mass, stones, or hydronephrosis. There are simple or benign cysts. No follow up is needed. BOWEL: Few sigmoid colonic diverticula are present. No dilated bowel or bowel wall thickening. LYMPH NODES: Normal. VASCULATURE: Normal caliber abdominal aorta and iliac arteries with only minimal patchy  atheromatous calcifications. PELVIC ORGANS: There is a 10.1 x 10.9 cm intermediate attenuation lesion arising from the left ovary (series 3, image 164) consistent with a hemorrhagic cyst. The left ovary is enlarged with heterogeneous attenuation including several peripheral fluid attenuation spaces. There is free fluid in the left adnexa. The right ovary is normal. Uterus is normal. MUSCULOSKELETAL: There are degenerative osteophytes near the thoracolumbar junction and lumbosacral facet arthropathy.     IMPRESSION: 1.  No acute pulmonary embolism. 2.  Emphysema, but no superimposed acute lung, airway, or pleural inflammatory process. 3.  Large, greater than 10 cm intermediate attenuation lesion arising from the left ovary likely a large hemorrhagic cyst. The adjacent ovarian parenchyma is enlarged and heterogeneous, concerning for ovarian torsion. Correlation with pelvic ultrasound is suggested. [Critical Result:  Suspected ovarian or testicular torsion] Finding was identified on 7/14/2025 12:31 PM CDT. Dr. Branham was contacted by me on 7/14/2025 1:10 PM CDT and verbalized understanding of the critical result.      Jeff Mackay M.D.  [facility] Emergency Department      Jeff Mackay MD  07/14/25 [id number]

## 2025-07-15 NOTE — PROGRESS NOTES
Patient returned call. Scheduled with Dr. Tapia tomorrow at [facility]. Pt has questions re: Emphysema noted on her CT scan. Recommended she follow-up with her Pulmonary team (through [facility]) or PCP to discuss since she is already established. No further questions at this time.

## 2025-07-15 NOTE — TELEPHONE ENCOUNTER
RECORDS STATUS - ALL OTHER DIAGNOSIS      RECORDS RECEIVED FROM: [facility]   NOTES STATUS DETAILS   OFFICE NOTE from referring provider Epic Dr. Jeff Mackay    DISCHARGE REPORT from the ER [facility] 7/14/2025 - [facility] ED   MEDICATION LIST [facility]    LABS     PATHOLOGY REPORTS     ANYTHING RELATED TO DIAGNOSIS Epic 7/14/2025   IMAGING (NEED IMAGES & REPORT)     CT SCANS PACS CT Abdomen Pelvis: 7/14/2025   ULTRASOUND PACS US Pelvis: 7/14/2025

## 2025-07-16 ENCOUNTER — ONCOLOGY VISIT (OUTPATIENT)
Dept: ONCOLOGY | Facility: HOSPITAL | Age: 65
End: 2025-07-16
Attending: FAMILY MEDICINE
Payer: COMMERCIAL

## 2025-07-16 ENCOUNTER — PRE VISIT (OUTPATIENT)
Dept: ONCOLOGY | Facility: HOSPITAL | Age: 65
End: 2025-07-16
Payer: COMMERCIAL

## 2025-07-16 ENCOUNTER — MYC MEDICAL ADVICE (OUTPATIENT)
Dept: RHEUMATOLOGY | Facility: CLINIC | Age: 65
End: 2025-07-16

## 2025-07-16 ENCOUNTER — MYC MEDICAL ADVICE (OUTPATIENT)
Dept: RHEUMATOLOGY | Facility: CLINIC | Age: 65
End: 2025-07-16
Payer: COMMERCIAL

## 2025-07-16 ENCOUNTER — TELEPHONE (OUTPATIENT)
Dept: ONCOLOGY | Facility: HOSPITAL | Age: 65
End: 2025-07-16

## 2025-07-16 VITALS
DIASTOLIC BLOOD PRESSURE: 84 MMHG | OXYGEN SATURATION: 97 % | HEART RATE: 84 BPM | RESPIRATION RATE: 20 BRPM | SYSTOLIC BLOOD PRESSURE: 155 MMHG

## 2025-07-16 DIAGNOSIS — R93.89 INCREASED ENDOMETRIAL STRIPE THICKNESS: Primary | ICD-10-CM

## 2025-07-16 DIAGNOSIS — R19.00 PELVIC MASS: ICD-10-CM

## 2025-07-16 DIAGNOSIS — N83.8 OVARIAN MASS: ICD-10-CM

## 2025-07-16 PROCEDURE — 99205 OFFICE O/P NEW HI 60 MIN: CPT | Mod: 25 | Performed by: OBSTETRICS & GYNECOLOGY

## 2025-07-16 PROCEDURE — 58100 BIOPSY OF UTERUS LINING: CPT | Performed by: OBSTETRICS & GYNECOLOGY

## 2025-07-16 PROCEDURE — 99213 OFFICE O/P EST LOW 20 MIN: CPT | Performed by: OBSTETRICS & GYNECOLOGY

## 2025-07-16 PROCEDURE — 88305 TISSUE EXAM BY PATHOLOGIST: CPT | Mod: TC | Performed by: OBSTETRICS & GYNECOLOGY

## 2025-07-16 PROCEDURE — 93010 ELECTROCARDIOGRAM REPORT: CPT | Mod: 59 | Performed by: OBSTETRICS & GYNECOLOGY

## 2025-07-16 RX ORDER — PHENAZOPYRIDINE HYDROCHLORIDE 100 MG/1
200 TABLET, FILM COATED ORAL ONCE
OUTPATIENT
Start: 2025-07-16 | End: 2025-07-16

## 2025-07-16 RX ORDER — HEPARIN SODIUM 5000 [USP'U]/.5ML
5000 INJECTION, SOLUTION INTRAVENOUS; SUBCUTANEOUS
OUTPATIENT
Start: 2025-07-16

## 2025-07-16 RX ORDER — ARNICA MONTANA 1 [HP_X]/G
OINTMENT CUTANEOUS
COMMUNITY

## 2025-07-16 ASSESSMENT — PAIN SCALES - GENERAL: PAINLEVEL_OUTOF10: NO PAIN (0)

## 2025-07-16 NOTE — TELEPHONE ENCOUNTER
"LOV: 6/30  FOV: 9/30  Dx: Inflammatory polyarthritis (H)  MAURY positive  Polyarthralgia  High risk medication use  Elevated CK  Hx of thyroid cancer    -     folic acid (FOLVITE) 1 MG tablet; Take 1 tablet (1 mg) by mouth daily.  - methotrexate 2.5 MG tablet; Take 8 tablets (20 mg) by mouth every 7 days.      works at a [employer] long hours, is here for follow-up of inflammatory polyarthritis affecting her hands, in the background of positive antinuclear antibody at 1: 160, With the homogenous pattern.  She has tolerated methotrexate 20 mg that she is placed for the morning of at night on Fridays.  Recent labs are within acceptable range.  Her CK remains persistently elevated going back several years at the similar range.  She had been taking prednisone at bedtime we discussed this she will switch to mornings.  Her joint pains have improved significantly in the hands she has more numbness tingling pain in the left hand worse with activity she feels that the epicenter of this lies on the ventral surface of the wrist.  13 years ago she recalls having had carpal tunnel release.  She does not want to go consider that again.  She is woken up from sleep because of this when she wakes up she \"shakes\" her hands.  She would like to go for corticosteroid injection and she remembers having this done in the past.  She noted there was improvement with the corticosteroid injection into the left carpal tunnel it felt as if the tendon has \"opened up, did not feel as tight, however the benefit has already dissipated by now and she is back to where she started, the injection was done almost 2 months or so ago.  Her surgeons have told her that there is nothing they can offer her for the carpal tunnel intervention as having failed the 12-week of hand therapy and other findings they felt was secondary to the scarring and would not recommend surgical intervention.  She is no longer on prednisone has tolerated methotrexate that we will " continue.

## 2025-07-16 NOTE — PATIENT INSTRUCTIONS
Completed TODAY:  Endometrial Bx     PLAN Surgery:  Exam under anesthesia, SALPINGO-OOPHORECTOMY, LAPAROSCOPIC, possible opne, possible hysterectomy, possible cancer staging

## 2025-07-16 NOTE — PROGRESS NOTES
Consult Notes on Referred Patient    Date: 2025       Dr. Jeff Mackay MD  [address] [place] [address]       RE: Chante Tracy  : 1960  TERESA: 2025    Dear Dr. Jeff Mackay:    I had the pleasure of seeing your patient Chante Tracy here at the Gynecologic Cancer Clinic at the [facility] on 2025.  As you know she is a very pleasant 64 year old woman with a recent diagnosis of pelvic mass.  Given these findings she was subsequently sent to the Gynecologic Cancer Clinic for new patient consultation.    with 1 prior vaginal delivery went through menopause at age 53.  She has been on the hormone replacement therapy in her 30s for 1 year.  She was recently in the emergency room 2 days ago for dizziness and nausea.  Workup including a CT scan found a 10 cm adnexal mass that appeared hemorrhagic and might have been torsed.  Her symptoms resolved.  She is now feeling much better she is eating and drinking well has no nausea vomiting fevers or chills.  She does have some slight what sounds like stress incontinence that she had for many years.  Normal bowel function.  Of note she has had a thyroid cancer that was just treated with thyroidectomy did not require any adjuvant treatment.  She has not had any vaginal bleeding or discharge since she went through menopause.  Of note her endometrial lining is slightly thickened at 7 mm on recent ultrasound.      Past Medical History:    Past Medical History:   Diagnosis Date    Arthritis     rheumatoid and osteo    Emphysema lung (H)          Past Surgical History:    Past Surgical History:   Procedure Laterality Date    CARPAL TUNNEL RELEASE RT/LT Left     1990s    THYROIDECTOMY   2023         Health Maintenance:  Health Maintenance Due   Topic Date Due    ADVANCE CARE PLANNING  Never done    YEARLY PREVENTIVE VISIT  Never done    COVID-19 VACCINE (1) Never done    COLORECTAL CANCER  SCREENING  Never done    HIV SCREENING  Never done    HEPATITIS C SCREENING  Never done    ZOSTER VACCINE (1 of 2) Never done    PNEUMOCOCCAL VACCINE 50+ YEARS (2 of 2 - PCV) 05/29/2020    RSV VACCINE (1 - Risk 60-74 years 1-dose series) Never done     No history of abnormal Pap smears.  Last colonoscopy was at age 52.  Father had esophageal cancer at age 62 he did smoke drink alcohol and chew tobacco.  Paternal uncle had lung cancer in his 80s.    Current Medications:     has a current medication list which includes the following prescription(s): amitriptyline, calcium citrate-vitamin d, cephalexin, cholecalciferol, folic acid, arnicare arnica, levothyroxine, loperamide, methotrexate, multi-vitamin, omeprazole, pramipexole, pramipexole, spiriva respimat, and valsartan.       Allergies:     Celecoxib, Hydrochlorothiazide-triamterene, Indapamide, Lisinopril, Penicillins, Simvastatin, Bacitracin, Belladonna, Ethacrynic acid, Fish allergy, Gabapentin, Hydrochlorothiazide, Ibuprofen, Triamterene, Budesonide-formoterol fumarate, Diazepam, Menthol, Neomycin, Neomycin-bacitracin zn-polymyx, Polymyxin b, and Sulfa antibiotics        Social History:     Social History     Tobacco Use    Smoking status: Former     Types: Cigarettes    Smokeless tobacco: Never   Substance Use Topics    Alcohol use: Not Currently       History   Drug Use Unknown           Family History:     Father had esophageal cancer at age 62.  He did smoke, drink alcohol and chew tobacco.  A paternal uncle had lung cancer in his 80s.    Family History   Problem Relation Age of Onset    Bronchitis Mother     Throat cancer Father     Cancer Paternal Uncle     Lung Cancer Paternal Uncle     Bronchitis Paternal Aunt     Breast Cancer No family hx of          Physical Exam:     BP (!) 155/84   Pulse 84   Resp 20   SpO2 97%   There is no height or weight on file to calculate BMI.    General Appearance: healthy and alert, no distress      Musculoskeletal: extremities non tender and without edema    Skin: no lesions or rashes     Neurological: normal gait, no gross defects     Psychiatric: appropriate mood and affect                               Hematological: normal cervical, supraclavicular and inguinal lymph nodes     Gastrointestinal:       abdomen soft, non-tender, non-distended, no organomegaly or masses    Genitourinary: External genitalia and urethral meatus appears normal.  Vagina is smooth without nodularity or masses.  Cervix appears normal and without lesions.  Bimanual exam reveal no masses, nodularity or fullness.  Recto-vaginal exam confirms these findings.    Patient was consented for endometrial biopsy this was done with using a tenaculum.  Patient tolerated the procedure well and was in stable condition at the end of the procedure.      Assessment:    Chante Tracy is a 64 year old woman with a new diagnosis of complex pelvic mass.     A total of 60 minutes was spent with the patient, chart review, documentation, coordination of care, in counseling the patient and/or treatment planning.  Does not include time for procedure.     Complex.pelvic mass  Class 3 obesity  Emphysema  HTN  RA on Methotrexate  7mm endometrial stripe     I discussed with the patient we will obtain a , CEA and CA 19-9 tumor markers.  In addition we will await the endometrial biopsy results.  Of note she has not had any postmenopausal bleeding despite the thickened endometrial stripe.  We will then proceed with a laparoscopic bilateral salpingo-oophorectomy, possible cancer staging and possible hysterectomy.  We will have her see my colleagues in anesthesia for preoperative optimization especially with her newly diagnosed emphysema.  The patient agrees with this plan, she is very appreciative of her care all questions were answered.      Risks, benefits and alternatives to proceed discussed in detail with the patient. Risks include but are not  limited to bleeding, infection, possible injury to surrounding organs including bowel, bladder, ureter, need for second procedure/surgery related to complications from first procedure, postoperative medical complications such as cardiopulmonary events, lymphedema, lymphocyst, thromboembolic events.         Thank you for allowing us to participate in the care of your patient.         Sincerely,        Patient Care Team:  Maria Esther Ho MD as PCP - General (Family Medicine)  Bipin Miller MD as MD (Neurology)  Bipin Miller MD as Assigned Neuroscience Provider  Pilo Corona MBBS as MD (Rheumatology)  Pilo Corona MBBS as Assigned Rheumatology Provider  Juan Carlos Tapia MD as MD (Gynecologic Oncology)  Jeff Restrepo MD as Physician (Emergency Medicine)  JEFF RESTREPO

## 2025-07-16 NOTE — LETTER
2025      Chante Tracy  [address]  [facility] [address]      Dear Colleague,    Thank you for referring your patient, Chante Tracy, to the [facility]. Please see a copy of my visit note below.                            Consult Notes on Referred Patient    Date: 2025       Dr. Jeff Mackay MD  [address] [place] [address]       RE: Chante Tracy  : 1960  TERESA: 2025    Dear Dr. Jeff Mackay:    I had the pleasure of seeing your patient Chante Tracy here at the Gynecologic Cancer Clinic at the [facility] on 2025.  As you know she is a very pleasant 64 year old woman with a recent diagnosis of pelvic mass.  Given these findings she was subsequently sent to the Gynecologic Cancer Clinic for new patient consultation.    with 1 prior vaginal delivery went through menopause at age 53.  She has been on the hormone replacement therapy in her 30s for 1 year.  She was recently in the emergency room 2 days ago for dizziness and nausea.  Workup including a CT scan found a 10 cm adnexal mass that appeared hemorrhagic and might have been torsed.  Her symptoms resolved.  She is now feeling much better she is eating and drinking well has no nausea vomiting fevers or chills.  She does have some slight what sounds like stress incontinence that she had for many years.  Normal bowel function.  Of note she has had a thyroid cancer that was just treated with thyroidectomy did not require any adjuvant treatment.  She has not had any vaginal bleeding or discharge since she went through menopause.  Of note her endometrial lining is slightly thickened at 7 mm on recent ultrasound.      Past Medical History:    Past Medical History:   Diagnosis Date     Arthritis     rheumatoid and osteo     Emphysema lung (H)          Past Surgical History:    Past Surgical History:   Procedure Laterality Date     CARPAL TUNNEL RELEASE  RT/LT Left     1990s     THYROIDECTOMY   04/21/2023         Health Maintenance:  Health Maintenance Due   Topic Date Due     ADVANCE CARE PLANNING  Never done     YEARLY PREVENTIVE VISIT  Never done     COVID-19 VACCINE (1) Never done     COLORECTAL CANCER SCREENING  Never done     HIV SCREENING  Never done     HEPATITIS C SCREENING  Never done     ZOSTER VACCINE (1 of 2) Never done     PNEUMOCOCCAL VACCINE 50+ YEARS (2 of 2 - PCV) 05/29/2020     RSV VACCINE (1 - Risk 60-74 years 1-dose series) Never done     No history of abnormal Pap smears.  Last colonoscopy was at age 52.  Father had esophageal cancer at age 62 he did smoke drink alcohol and chew tobacco.  Paternal uncle had lung cancer in his 80s.    Current Medications:     has a current medication list which includes the following prescription(s): amitriptyline, calcium citrate-vitamin d, cephalexin, cholecalciferol, folic acid, arnicare arnica, levothyroxine, loperamide, methotrexate, multi-vitamin, omeprazole, pramipexole, pramipexole, spiriva respimat, and valsartan.       Allergies:     Celecoxib, Hydrochlorothiazide-triamterene, Indapamide, Lisinopril, Penicillins, Simvastatin, Bacitracin, Belladonna, Ethacrynic acid, Fish allergy, Gabapentin, Hydrochlorothiazide, Ibuprofen, Triamterene, Budesonide-formoterol fumarate, Diazepam, Menthol, Neomycin, Neomycin-bacitracin zn-polymyx, Polymyxin b, and Sulfa antibiotics        Social History:     Social History     Tobacco Use     Smoking status: Former     Types: Cigarettes     Smokeless tobacco: Never   Substance Use Topics     Alcohol use: Not Currently       History   Drug Use Unknown           Family History:     Father had esophageal cancer at age 62.  He did smoke, drink alcohol and chew tobacco.  A paternal uncle had lung cancer in his 80s.    Family History   Problem Relation Age of Onset     Bronchitis Mother      Throat cancer Father      Cancer Paternal Uncle      Lung Cancer Paternal Uncle       Bronchitis Paternal Aunt      Breast Cancer No family hx of          Physical Exam:     BP (!) 155/84   Pulse 84   Resp 20   SpO2 97%   There is no height or weight on file to calculate BMI.    General Appearance: healthy and alert, no distress     Musculoskeletal: extremities non tender and without edema    Skin: no lesions or rashes     Neurological: normal gait, no gross defects     Psychiatric: appropriate mood and affect                               Hematological: normal cervical, supraclavicular and inguinal lymph nodes     Gastrointestinal:       abdomen soft, non-tender, non-distended, no organomegaly or masses    Genitourinary: External genitalia and urethral meatus appears normal.  Vagina is smooth without nodularity or masses.  Cervix appears normal and without lesions.  Bimanual exam reveal no masses, nodularity or fullness.  Recto-vaginal exam confirms these findings.    Patient was consented for endometrial biopsy this was done with using a tenaculum.  Patient tolerated the procedure well and was in stable condition at the end of the procedure.      Assessment:    Chante Tracy is a 64 year old woman with a new diagnosis of complex pelvic mass.     A total of 60 minutes was spent with the patient, chart review, documentation, coordination of care, in counseling the patient and/or treatment planning.  Does not include time for procedure.     Complex.pelvic mass  Class 3 obesity  Emphysema  HTN  RA on Methotrexate  7mm endometrial stripe     I discussed with the patient we will obtain a , CEA and CA 19-9 tumor markers.  In addition we will await the endometrial biopsy results.  Of note she has not had any postmenopausal bleeding despite the thickened endometrial stripe.  We will then proceed with a laparoscopic bilateral salpingo-oophorectomy, possible cancer staging and possible hysterectomy.  We will have her see my colleagues in anesthesia for preoperative optimization especially with  her newly diagnosed emphysema.  The patient agrees with this plan, she is very appreciative of her care all questions were answered.      Risks, benefits and alternatives to proceed discussed in detail with the patient. Risks include but are not limited to bleeding, infection, possible injury to surrounding organs including bowel, bladder, ureter, need for second procedure/surgery related to complications from first procedure, postoperative medical complications such as cardiopulmonary events, lymphedema, lymphocyst, thromboembolic events.         Thank you for allowing us to participate in the care of your patient.         Sincerely,        Patient Care Team:  Maria Esther Ho MD as PCP - General (Family Medicine)  Bipin Miller MD as MD (Neurology)  Bipin Miller MD as Assigned Neuroscience Provider  Pilo Corona MBBS as MD (Rheumatology)  Pilo Corona MBBS as Assigned Rheumatology Provider  Juan Carlos Tapia MD as MD (Gynecologic Oncology)  Jeff Restrepo MD as Physician (Emergency Medicine)  JEFF RESTREPO        Again, thank you for allowing me to participate in the care of your patient.        Sincerely,        Juan Carlos Tapia MD    Electronically signed

## 2025-07-16 NOTE — NURSING NOTE
"Oncology Rooming Note    July 16, 2025 10:00 AM   Chante Tracy is a 64 year old female who presents for:    Chief Complaint   Patient presents with    Oncology Clinic Visit     Gyn Onc consult     Initial Vitals: BP (!) 155/84   Pulse 84   Resp 20   SpO2 97%  Estimated body mass index is 41.63 kg/m  as calculated from the following:    Height as of 7/14/25: 1.6 m (5' 3\").    Weight as of 7/14/25: 106.6 kg (235 lb). There is no height or weight on file to calculate BSA.  No Pain (0) Comment: Data Unavailable   No LMP recorded.  Allergies reviewed: Yes  Medications reviewed: Yes    Medications: Medication refills not needed today.  Pharmacy name entered into [employer]: [facility] [address] [place] [address] [[address]    PHQ9:  Did this patient require a PHQ9?: No      Clinical concerns: Does not have pelvic or abd pain.  Dr. Tapia was NOT notified.      Isabelle Kemp RN              "
Prior to the start of the procedure and with procedural staff participation, I verbally confirmed the patient s identity using two indicators, relevant allergies, that the procedure was appropriate and matched the consent or emergent situation, and that the correct equipment/implants were available. Immediately prior to starting the procedure I conducted the Time Out with the procedural staff and re-confirmed the patient s name, procedure, and site/side. (The Joint Commission universal protocol was followed.)  Yes    Sedation (Moderate or Deep): None    Post procedure pain: 0    Completed:  Endometrial Bx    Sowmya Augustin RN    
Spontaneous, unlabored and symmetrical

## 2025-07-16 NOTE — TELEPHONE ENCOUNTER
Received a phone call from patient's daughter, Chrissie. She has FMLA forms that need to get filled out and signed by Dr. Tapia. She states the forms are due by the end of the month. She plans to drop them off this afternoon.  and JASVIR Lockwood were notified.     Shante Yusuf RN on 7/16/2025 at 3:10 PM

## 2025-07-17 LAB
PATH REPORT.COMMENTS IMP SPEC: NORMAL
PATH REPORT.FINAL DX SPEC: NORMAL
PATH REPORT.GROSS SPEC: NORMAL
PATH REPORT.MICROSCOPIC SPEC OTHER STN: NORMAL
PATH REPORT.RELEVANT HX SPEC: NORMAL
PHOTO IMAGE: NORMAL

## 2025-07-19 ENCOUNTER — HEALTH MAINTENANCE LETTER (OUTPATIENT)
Age: 65
End: 2025-07-19

## 2025-07-24 NOTE — TELEPHONE ENCOUNTER
FUTURE VISIT INFORMATION      SURGERY INFORMATION:  Date: 25  Location: [facility]  Surgeon:  Juan Carlos Tapai MD   Anesthesia Type:  General  Procedure: Exam under anesthesia, SALPINGO-OOPHORECTOMY, LAPAROSCOPIC, possible open, possible hysterectomy, possible cancer staging   Consult: 25    RECORDS REQUESTED FROM:       Primary Care Provider: Maria Esther Ho MD    Pertinent Medical History: Emphysema lung, Hypoparathyroidism after surgical removal of thyroid gland, Papillary thyroid carcinoma, hypothyroidism, crohn disease, HLD, HTN, Obesity, GERD    Most recent EKG+ Tracin25, internal    Most recent PFT's: 6/10/22, [facility]    Most recent Sleep Study: 3/14/23, Jose Elias

## 2025-07-29 ENCOUNTER — OFFICE VISIT (OUTPATIENT)
Dept: RHEUMATOLOGY | Facility: CLINIC | Age: 65
End: 2025-07-29
Payer: COMMERCIAL

## 2025-07-29 VITALS
HEART RATE: 74 BPM | SYSTOLIC BLOOD PRESSURE: 129 MMHG | WEIGHT: 235.8 LBS | BODY MASS INDEX: 41.77 KG/M2 | DIASTOLIC BLOOD PRESSURE: 81 MMHG | OXYGEN SATURATION: 96 %

## 2025-07-29 DIAGNOSIS — R76.8 ANA POSITIVE: ICD-10-CM

## 2025-07-29 DIAGNOSIS — R74.8 ELEVATED CK: ICD-10-CM

## 2025-07-29 DIAGNOSIS — J43.9 PULMONARY EMPHYSEMA, UNSPECIFIED EMPHYSEMA TYPE (H): ICD-10-CM

## 2025-07-29 DIAGNOSIS — M25.50 POLYARTHRALGIA: ICD-10-CM

## 2025-07-29 DIAGNOSIS — M15.0 PRIMARY OSTEOARTHRITIS INVOLVING MULTIPLE JOINTS: ICD-10-CM

## 2025-07-29 DIAGNOSIS — M06.4 INFLAMMATORY POLYARTHRITIS (H): Primary | ICD-10-CM

## 2025-07-29 PROCEDURE — 3074F SYST BP LT 130 MM HG: CPT | Performed by: INTERNAL MEDICINE

## 2025-07-29 PROCEDURE — 3079F DIAST BP 80-89 MM HG: CPT | Performed by: INTERNAL MEDICINE

## 2025-07-29 PROCEDURE — G2211 COMPLEX E/M VISIT ADD ON: HCPCS | Performed by: INTERNAL MEDICINE

## 2025-07-29 PROCEDURE — 99214 OFFICE O/P EST MOD 30 MIN: CPT | Performed by: INTERNAL MEDICINE

## 2025-07-29 RX ORDER — HYDROXYCHLOROQUINE SULFATE 200 MG/1
200 TABLET, FILM COATED ORAL 2 TIMES DAILY
Qty: 60 TABLET | Refills: 3 | Status: SHIPPED | OUTPATIENT
Start: 2025-07-29 | End: 2025-08-28

## 2025-07-29 NOTE — PROGRESS NOTES
Rheumatology follow-up visit note     Chante is a 64 year old female presents today for follow-up.    Chante was seen today for recheck.    Diagnoses and all orders for this visit:    Inflammatory polyarthritis (H)  -     hydroxychloroquine (PLAQUENIL) 200 MG tablet; Take 1 tablet (200 mg) by mouth 2 times daily.    MAURY positive  -     hydroxychloroquine (PLAQUENIL) 200 MG tablet; Take 1 tablet (200 mg) by mouth 2 times daily.    Polyarthralgia    Elevated CK    Primary osteoarthritis involving multiple joints    Pulmonary emphysema, unspecified emphysema type (H)        The longitudinal plan of care for the diagnosis(es)/condition(s) as documented were addressed during this visit. Due to the added complexity in care, I will continue to support Chante in the subsequent management and with ongoing continuity of care.      Follow up in 3 months.    HPI    Chante Tracy is a 64 year old female is here for follow-up of   inflammatory polyarthritis affecting her hands, in the background of positive antinuclear antibody at 1: 160, With the homogenous pattern. Received initially she had tolerated methotrexate quite nicely with improvement in her joint symptoms but then she developed mouth ulcers especially on the right side.  These were associated with dentures she does not think that dentures would be the cause for this.  Then she went on to have further workup turned out that she has emphysema, as well as a large ovarian lesion 10 cm due for surgery in a month.  She is no longer on methotrexate.  We discussed options she will go for hydroxychloroquine literature is provided.  On the previous visit we had discussed briefly the option of taking duloxetine for OA related symptoms that would be deferred for now until this issue stabilizes.    Following is the excerpt from a previous note:    She has tolerated methotrexate 20 mg that she is placed for the morning of at night on Fridays. Recent labs are within  "acceptable range. Her CK remains persistently elevated going back several years at the similar range. She had been taking prednisone at bedtime we discussed this she will switch to mornings. Her joint pains have improved significantly in the hands she has more numbness tingling pain in the left hand worse with activity she feels that the epicenter of this lies on the ventral surface of the wrist. 13 years ago she recalls having had carpal tunnel release. She does not want to go consider that again. She is woken up from sleep because of this when she wakes up she \"shakes\" her hands. She would like to go for corticosteroid injection and she remembers having this done in the past. She noted there was improvement with the corticosteroid injection into the left carpal tunnel it felt as if the tendon has \"opened up, did not feel as tight, however the benefit has already dissipated by now and she is back to where she started, the injection was done almost 2 months or so ago. Her surgeons have told her that there is nothing they can offer her for the carpal tunnel intervention as having failed the 12-week of hand therapy and other findings they felt was secondary to the scarring and would not recommend surgical intervention. She is no longer on prednisone has tolerated methotrexate that we will continue.         DETAILED EXAMINATION  07/29/25  :    Vitals:    07/29/25 0837   BP: 129/81   BP Location: Right arm   Pulse: 74   SpO2: 96%   Weight: 107 kg (235 lb 12.8 oz)     Alert oriented. Head including the face is examined for malar rash, heliotropes, scarring, lupus pernio. Eyes examined for redness such as in episcleritis/scleritis, periorbital lesions.   Neck/ Face examined for parotid gland swelling, range of motion of neck.  Left upper and lower and right upper and lower extremities examined for tenderness, swelling, warmth of the appendicular joints, range of motion, edema, rash.  Some of the important findings " included: she does not have evidence of synovitis in the palpable joints of the upper extremities.  No significant deformities of the digits.  + Heberden nodes.  Range of motion of the shoulders  show full abduction.  No JLT effusion or warmth of the knees.  she does not have dactylitis of the digits.     There are no active problems to display for this patient.    Past Surgical History:   Procedure Laterality Date    CARPAL TUNNEL RELEASE RT/LT Left     1990s    THYROIDECTOMY   04/21/2023      Past Medical History:   Diagnosis Date    Arthritis     rheumatoid and osteo    Emphysema lung (H)      Allergies   Allergen Reactions    Celecoxib Hives and Nausea and Vomiting    Hydrochlorothiazide-Triamterene Hives     Nose bleeds    Indapamide Hives    Lisinopril Hives    Penicillins Other (See Comments) and Hives     Family hx    Simvastatin Hives    Bacitracin Other (See Comments)     Eats away skin    Belladonna GI Disturbance     Can't digest    Ethacrynic Acid Other (See Comments)     Slept all day    Fish Allergy GI Disturbance     Can't digest    Gabapentin Other (See Comments)     Unsteady feet    Hydrochlorothiazide Other (See Comments)    Ibuprofen Other (See Comments)     Causes ulcers   Eats lining of stomach    Causes ulcers    Triamterene Hives     Nose bleeds    Budesonide-Formoterol Fumarate Rash    Diazepam Other (See Comments) and Palpitations     Heart racing    Menthol Rash     Icy Hot patch    Neomycin Rash    Neomycin-Bacitracin Zn-Polymyx Rash     Eats away skin    Polymyxin B Rash     Eats away skin    Sulfa Antibiotics Hives and Other (See Comments)     Family hx     Current Outpatient Medications   Medication Sig Dispense Refill    amitriptyline (ELAVIL) 10 MG tablet Take 10 mg by mouth At Bedtime      calcium citrate-vitamin D (CITRACAL) 200-6.25 MG-MCG TABS per tablet Take 1 tablet by mouth 2 times daily.      cholecalciferol (VITAMIN D3) 5000 units (125 mcg) capsule Take 5,000 Units by mouth  daily      folic acid (FOLVITE) 1 MG tablet Take 1 tablet (1 mg) by mouth daily. 90 tablet 0    Homeopathic Products (ARNICARE ARNICA) OINT Externally apply topically. Foot pain      levothyroxine (SYNTHROID/LEVOTHROID) 200 MCG tablet Take 200 mcg by mouth daily      loperamide (IMODIUM A-D) 2 MG tablet Take 2 mg by mouth 4 times daily as needed for diarrhea      methotrexate 2.5 MG tablet Take 8 tablets by mouth every 7 days 32 tablet 2    multivitamin w/minerals (MULTI-VITAMIN) tablet Take 1 tablet by mouth daily      omeprazole (PRILOSEC) 20 MG DR capsule Take 20 mg by mouth      pramipexole (MIRAPEX) 0.125 MG tablet Take 0.125 mg by mouth 2 times daily      pramipexole (MIRAPEX) 0.5 MG tablet Take 1 mg by mouth 2 times daily       SPIRIVA RESPIMAT 2.5 MCG/ACT inhaler Inhale 2 puffs into the lungs daily      valsartan (DIOVAN) 160 MG tablet Take 1 tablet by mouth daily at 2 pm       family history includes Bronchitis in her mother and paternal aunt; Cancer in her paternal uncle; Lung Cancer in her paternal uncle; Throat cancer in her father.  Social Connections: Unknown (2/3/2023)    [facility]    Social Connections     Frequency of Communication with Friends and Family: Not on file          WBC Count   Date Value Ref Range Status   07/14/2025 9.0 4.0 - 11.0 10e3/uL Final     RBC Count   Date Value Ref Range Status   07/14/2025 4.19 3.80 - 5.20 10e6/uL Final     Hemoglobin   Date Value Ref Range Status   07/14/2025 12.0 11.7 - 15.7 g/dL Final     Hematocrit   Date Value Ref Range Status   07/14/2025 39.3 35.0 - 47.0 % Final     MCV   Date Value Ref Range Status   07/14/2025 94 78 - 100 fL Final     MCH   Date Value Ref Range Status   07/14/2025 28.6 26.5 - 33.0 pg Final     Platelet Count   Date Value Ref Range Status   07/14/2025 257 150 - 450 10e3/uL Final     % Lymphocytes   Date Value Ref Range Status   07/14/2025 11 % Final     AST   Date Value Ref Range Status    07/14/2025 29 0 - 45 U/L Final     ALT   Date Value Ref Range Status   07/14/2025 34 0 - 50 U/L Final     Albumin   Date Value Ref Range Status   07/14/2025 4.1 3.5 - 5.2 g/dL Final   11/12/2019 4.1 3.5 - 5.0 g/dL Final     Alkaline Phosphatase   Date Value Ref Range Status   07/14/2025 102 40 - 150 U/L Final     Creatinine   Date Value Ref Range Status   07/14/2025 0.87 0.51 - 0.95 mg/dL Final     GFR Estimate   Date Value Ref Range Status   07/14/2025 75 >60 mL/min/1.73m2 Final     Comment:     eGFR calculated using 2021 CKD-EPI equation.   11/03/2020 >60 >60 mL/min/1.73m2 Final     GFR Estimate If Black   Date Value Ref Range Status   11/03/2020 >60 >60 mL/min/1.73m2 Final     Erythrocyte Sedimentation Rate   Date Value Ref Range Status   02/06/2025 37 (H) 0 - 30 mm/hr Final

## 2025-07-30 ENCOUNTER — ANCILLARY PROCEDURE (OUTPATIENT)
Dept: MAMMOGRAPHY | Facility: CLINIC | Age: 65
End: 2025-07-30
Attending: FAMILY MEDICINE
Payer: COMMERCIAL

## 2025-07-30 DIAGNOSIS — Z12.31 VISIT FOR SCREENING MAMMOGRAM: ICD-10-CM

## 2025-07-30 PROCEDURE — 77063 BREAST TOMOSYNTHESIS BI: CPT

## 2025-08-10 LAB
ABO + RH BLD: NORMAL
BLD GP AB SCN SERPL QL: NEGATIVE
SPECIMEN EXP DATE BLD: NORMAL

## 2025-08-11 ENCOUNTER — APPOINTMENT (OUTPATIENT)
Dept: LAB | Facility: CLINIC | Age: 65
End: 2025-08-11
Payer: COMMERCIAL

## 2025-08-11 ENCOUNTER — ANESTHESIA EVENT (OUTPATIENT)
Dept: SURGERY | Facility: CLINIC | Age: 65
End: 2025-08-11
Payer: COMMERCIAL

## 2025-08-11 ENCOUNTER — LAB (OUTPATIENT)
Dept: LAB | Facility: CLINIC | Age: 65
End: 2025-08-11
Payer: COMMERCIAL

## 2025-08-11 ENCOUNTER — PRE VISIT (OUTPATIENT)
Dept: SURGERY | Facility: CLINIC | Age: 65
End: 2025-08-11

## 2025-08-11 ENCOUNTER — OFFICE VISIT (OUTPATIENT)
Dept: SURGERY | Facility: CLINIC | Age: 65
End: 2025-08-11
Payer: COMMERCIAL

## 2025-08-11 VITALS
DIASTOLIC BLOOD PRESSURE: 82 MMHG | SYSTOLIC BLOOD PRESSURE: 135 MMHG | WEIGHT: 236 LBS | RESPIRATION RATE: 16 BRPM | HEIGHT: 63 IN | HEART RATE: 84 BPM | OXYGEN SATURATION: 95 % | BODY MASS INDEX: 41.82 KG/M2 | TEMPERATURE: 98 F

## 2025-08-11 DIAGNOSIS — R19.00 PELVIC MASS: ICD-10-CM

## 2025-08-11 DIAGNOSIS — Z01.818 PRE-OPERATIVE EXAMINATION: ICD-10-CM

## 2025-08-11 DIAGNOSIS — Z01.818 PRE-OPERATIVE EXAMINATION: Primary | ICD-10-CM

## 2025-08-11 LAB
ALBUMIN UR-MCNC: NEGATIVE MG/DL
APPEARANCE UR: CLEAR
BACTERIA #/AREA URNS HPF: ABNORMAL /HPF
BILIRUB UR QL STRIP: NEGATIVE
COLOR UR AUTO: ABNORMAL
ERYTHROCYTE [DISTWIDTH] IN BLOOD BY AUTOMATED COUNT: 13.6 % (ref 10–15)
FERRITIN SERPL-MCNC: 66 NG/ML (ref 11–328)
GLUCOSE UR STRIP-MCNC: NEGATIVE MG/DL
HCT VFR BLD AUTO: 39.7 % (ref 35–47)
HGB BLD-MCNC: 12.1 G/DL (ref 11.7–15.7)
HGB UR QL STRIP: NEGATIVE
HOLD SPECIMEN: NORMAL
IRON BINDING CAPACITY (ROCHE): 333 UG/DL (ref 240–430)
IRON SATN MFR SERPL: 17 % (ref 15–46)
IRON SERPL-MCNC: 56 UG/DL (ref 37–145)
KETONES UR STRIP-MCNC: NEGATIVE MG/DL
LEUKOCYTE ESTERASE UR QL STRIP: NEGATIVE
MCH RBC QN AUTO: 28.1 PG (ref 26.5–33)
MCHC RBC AUTO-ENTMCNC: 30.5 G/DL (ref 31.5–36.5)
MCV RBC AUTO: 92 FL (ref 78–100)
MUCOUS THREADS #/AREA URNS LPF: PRESENT /LPF
NITRATE UR QL: NEGATIVE
PH UR STRIP: 7 [PH] (ref 5–7)
PLATELET # BLD AUTO: 218 10E3/UL (ref 150–450)
RBC # BLD AUTO: 4.3 10E6/UL (ref 3.8–5.2)
RBC URINE: 1 /HPF
SP GR UR STRIP: 1.01 (ref 1–1.03)
SQUAMOUS EPITHELIAL: 2 /HPF
UROBILINOGEN UR STRIP-MCNC: NORMAL MG/DL
WBC # BLD AUTO: 5.5 10E3/UL (ref 4–11)
WBC URINE: <1 /HPF

## 2025-08-11 PROCEDURE — 81001 URINALYSIS AUTO W/SCOPE: CPT | Performed by: PATHOLOGY

## 2025-08-11 PROCEDURE — 85027 COMPLETE CBC AUTOMATED: CPT | Performed by: PATHOLOGY

## 2025-08-11 PROCEDURE — 86850 RBC ANTIBODY SCREEN: CPT

## 2025-08-11 PROCEDURE — 36415 COLL VENOUS BLD VENIPUNCTURE: CPT | Performed by: PATHOLOGY

## 2025-08-11 PROCEDURE — 82728 ASSAY OF FERRITIN: CPT | Performed by: PATHOLOGY

## 2025-08-11 PROCEDURE — 99203 OFFICE O/P NEW LOW 30 MIN: CPT | Performed by: PHYSICIAN ASSISTANT

## 2025-08-11 PROCEDURE — 83540 ASSAY OF IRON: CPT | Performed by: PATHOLOGY

## 2025-08-11 PROCEDURE — 83550 IRON BINDING TEST: CPT | Performed by: PATHOLOGY

## 2025-08-11 ASSESSMENT — PAIN SCALES - GENERAL: PAINLEVEL_OUTOF10: MODERATE PAIN (6)

## 2025-08-11 ASSESSMENT — COPD QUESTIONNAIRES
COPD: 1
CAT_SEVERITY: MILD

## 2025-08-11 ASSESSMENT — ENCOUNTER SYMPTOMS: SEIZURES: 0

## 2025-08-11 ASSESSMENT — LIFESTYLE VARIABLES: TOBACCO_USE: 1

## 2025-08-26 ENCOUNTER — ANESTHESIA (OUTPATIENT)
Dept: SURGERY | Facility: CLINIC | Age: 65
End: 2025-08-26
Payer: COMMERCIAL

## 2025-08-26 ENCOUNTER — HOSPITAL ENCOUNTER (INPATIENT)
Facility: CLINIC | Age: 65
DRG: 742 | End: 2025-08-26
Attending: OBSTETRICS & GYNECOLOGY | Admitting: OBSTETRICS & GYNECOLOGY
Payer: COMMERCIAL

## 2025-08-26 PROBLEM — Z98.890 S/P LAPAROTOMY: Status: ACTIVE | Noted: 2025-08-26

## 2025-08-26 PROCEDURE — 258N000003 HC RX IP 258 OP 636: Performed by: NURSE ANESTHETIST, CERTIFIED REGISTERED

## 2025-08-26 PROCEDURE — 250N000011 HC RX IP 250 OP 636: Performed by: NURSE ANESTHETIST, CERTIFIED REGISTERED

## 2025-08-26 PROCEDURE — 250N000009 HC RX 250: Performed by: NURSE ANESTHETIST, CERTIFIED REGISTERED

## 2025-08-26 RX ORDER — ONDANSETRON 2 MG/ML
INJECTION INTRAMUSCULAR; INTRAVENOUS PRN
Status: DISCONTINUED | OUTPATIENT
Start: 2025-08-26 | End: 2025-08-26

## 2025-08-26 RX ORDER — PROPOFOL 10 MG/ML
INJECTION, EMULSION INTRAVENOUS PRN
Status: DISCONTINUED | OUTPATIENT
Start: 2025-08-26 | End: 2025-08-26

## 2025-08-26 RX ORDER — LABETALOL 20 MG/4 ML (5 MG/ML) INTRAVENOUS SYRINGE
PRN
Status: DISCONTINUED | OUTPATIENT
Start: 2025-08-26 | End: 2025-08-26

## 2025-08-26 RX ORDER — PROPOFOL 10 MG/ML
INJECTION, EMULSION INTRAVENOUS CONTINUOUS PRN
Status: DISCONTINUED | OUTPATIENT
Start: 2025-08-26 | End: 2025-08-26

## 2025-08-26 RX ORDER — FENTANYL CITRATE 50 UG/ML
INJECTION, SOLUTION INTRAMUSCULAR; INTRAVENOUS PRN
Status: DISCONTINUED | OUTPATIENT
Start: 2025-08-26 | End: 2025-08-26

## 2025-08-26 RX ORDER — SODIUM CHLORIDE 9 MG/ML
INJECTION, SOLUTION INTRAVENOUS CONTINUOUS PRN
Status: DISCONTINUED | OUTPATIENT
Start: 2025-08-26 | End: 2025-08-26

## 2025-08-26 RX ORDER — SODIUM CHLORIDE, SODIUM LACTATE, POTASSIUM CHLORIDE, CALCIUM CHLORIDE 600; 310; 30; 20 MG/100ML; MG/100ML; MG/100ML; MG/100ML
INJECTION, SOLUTION INTRAVENOUS CONTINUOUS PRN
Status: DISCONTINUED | OUTPATIENT
Start: 2025-08-26 | End: 2025-08-26

## 2025-08-26 RX ORDER — LIDOCAINE HYDROCHLORIDE 20 MG/ML
INJECTION, SOLUTION INFILTRATION; PERINEURAL PRN
Status: DISCONTINUED | OUTPATIENT
Start: 2025-08-26 | End: 2025-08-26

## 2025-08-26 RX ORDER — CEFAZOLIN SODIUM/WATER 2 G/20 ML
SYRINGE (ML) INTRAVENOUS PRN
Status: DISCONTINUED | OUTPATIENT
Start: 2025-08-26 | End: 2025-08-26

## 2025-08-26 RX ORDER — DEXAMETHASONE SODIUM PHOSPHATE 4 MG/ML
INJECTION, SOLUTION INTRA-ARTICULAR; INTRALESIONAL; INTRAMUSCULAR; INTRAVENOUS; SOFT TISSUE PRN
Status: DISCONTINUED | OUTPATIENT
Start: 2025-08-26 | End: 2025-08-26

## 2025-08-26 RX ADMIN — PROPOFOL 200 MG: 10 INJECTION, EMULSION INTRAVENOUS at 10:25

## 2025-08-26 RX ADMIN — FENTANYL CITRATE 100 MCG: 50 INJECTION INTRAMUSCULAR; INTRAVENOUS at 10:25

## 2025-08-26 RX ADMIN — PHENYLEPHRINE HYDROCHLORIDE 100 MCG: 10 INJECTION INTRAVENOUS at 10:41

## 2025-08-26 RX ADMIN — Medication 20 MG: at 11:05

## 2025-08-26 RX ADMIN — PHENYLEPHRINE HYDROCHLORIDE 100 MCG: 10 INJECTION INTRAVENOUS at 10:25

## 2025-08-26 RX ADMIN — Medication 2 G: at 12:25

## 2025-08-26 RX ADMIN — PHENYLEPHRINE HYDROCHLORIDE 100 MCG: 10 INJECTION INTRAVENOUS at 10:45

## 2025-08-26 RX ADMIN — ONDANSETRON 4 MG: 2 INJECTION INTRAMUSCULAR; INTRAVENOUS at 13:33

## 2025-08-26 RX ADMIN — HYDROMORPHONE HYDROCHLORIDE 0.5 MG: 1 INJECTION, SOLUTION INTRAMUSCULAR; INTRAVENOUS; SUBCUTANEOUS at 13:51

## 2025-08-26 RX ADMIN — PHENYLEPHRINE HYDROCHLORIDE 100 MCG: 10 INJECTION INTRAVENOUS at 11:05

## 2025-08-26 RX ADMIN — Medication 50 MG: at 10:25

## 2025-08-26 RX ADMIN — DEXMEDETOMIDINE HYDROCHLORIDE 12 MCG: 100 INJECTION, SOLUTION INTRAVENOUS at 13:13

## 2025-08-26 RX ADMIN — DEXAMETHASONE SODIUM PHOSPHATE 10 MG: 4 INJECTION, SOLUTION INTRAMUSCULAR; INTRAVENOUS at 10:25

## 2025-08-26 RX ADMIN — PHENYLEPHRINE HYDROCHLORIDE 100 MCG: 10 INJECTION INTRAVENOUS at 11:10

## 2025-08-26 RX ADMIN — Medication 100 MG: at 13:43

## 2025-08-26 RX ADMIN — FENTANYL CITRATE 50 MCG: 50 INJECTION INTRAMUSCULAR; INTRAVENOUS at 11:50

## 2025-08-26 RX ADMIN — PHENYLEPHRINE HYDROCHLORIDE 100 MCG: 10 INJECTION INTRAVENOUS at 10:58

## 2025-08-26 RX ADMIN — LIDOCAINE HYDROCHLORIDE 100 MG: 20 INJECTION, SOLUTION INFILTRATION; PERINEURAL at 10:25

## 2025-08-26 RX ADMIN — DEXMEDETOMIDINE HYDROCHLORIDE 8 MCG: 100 INJECTION, SOLUTION INTRAVENOUS at 13:23

## 2025-08-26 RX ADMIN — PROPOFOL 30 MCG/KG/MIN: 10 INJECTION, EMULSION INTRAVENOUS at 10:30

## 2025-08-26 RX ADMIN — FENTANYL CITRATE 50 MCG: 50 INJECTION INTRAMUSCULAR; INTRAVENOUS at 12:16

## 2025-08-26 RX ADMIN — Medication 20 MG: at 11:33

## 2025-08-26 RX ADMIN — SODIUM CHLORIDE, SODIUM LACTATE, POTASSIUM CHLORIDE, AND CALCIUM CHLORIDE: .6; .31; .03; .02 INJECTION, SOLUTION INTRAVENOUS at 10:12

## 2025-08-26 RX ADMIN — Medication 20 MG: at 12:16

## 2025-08-26 RX ADMIN — SODIUM CHLORIDE: 9 INJECTION, SOLUTION INTRAVENOUS at 10:47

## 2025-08-26 RX ADMIN — LABETALOL 20 MG/4 ML (5 MG/ML) INTRAVENOUS SYRINGE 5 MG: at 13:23

## 2025-08-26 ASSESSMENT — ACTIVITIES OF DAILY LIVING (ADL)
ADLS_ACUITY_SCORE: 35
ADLS_ACUITY_SCORE: 34
ADLS_ACUITY_SCORE: 35
ADLS_ACUITY_SCORE: 39
ADLS_ACUITY_SCORE: 34
ADLS_ACUITY_SCORE: 34
ADLS_ACUITY_SCORE: 39
ADLS_ACUITY_SCORE: 34
ADLS_ACUITY_SCORE: 35
ADLS_ACUITY_SCORE: 39
ADLS_ACUITY_SCORE: 35
ADLS_ACUITY_SCORE: 34
ADLS_ACUITY_SCORE: 39

## 2025-08-26 ASSESSMENT — ENCOUNTER SYMPTOMS: SEIZURES: 0

## 2025-08-26 ASSESSMENT — COPD QUESTIONNAIRES
COPD: 1
CAT_SEVERITY: MILD

## 2025-08-26 ASSESSMENT — LIFESTYLE VARIABLES: TOBACCO_USE: 1

## 2025-08-27 ENCOUNTER — APPOINTMENT (OUTPATIENT)
Dept: PHYSICAL THERAPY | Facility: CLINIC | Age: 65
DRG: 742 | End: 2025-08-27
Payer: COMMERCIAL

## 2025-08-27 ENCOUNTER — APPOINTMENT (OUTPATIENT)
Dept: GENERAL RADIOLOGY | Facility: CLINIC | Age: 65
DRG: 742 | End: 2025-08-27
Payer: COMMERCIAL

## 2025-08-27 PROCEDURE — 71045 X-RAY EXAM CHEST 1 VIEW: CPT | Mod: 26 | Performed by: RADIOLOGY

## 2025-08-27 PROCEDURE — 71045 X-RAY EXAM CHEST 1 VIEW: CPT

## 2025-08-27 ASSESSMENT — ACTIVITIES OF DAILY LIVING (ADL)
ADLS_ACUITY_SCORE: 39
ADLS_ACUITY_SCORE: 50
ADLS_ACUITY_SCORE: 39

## 2025-08-28 ENCOUNTER — APPOINTMENT (OUTPATIENT)
Dept: PHYSICAL THERAPY | Facility: CLINIC | Age: 65
DRG: 742 | End: 2025-08-28
Payer: COMMERCIAL

## 2025-08-28 ASSESSMENT — ACTIVITIES OF DAILY LIVING (ADL)
ADLS_ACUITY_SCORE: 50
ADLS_ACUITY_SCORE: 52
ADLS_ACUITY_SCORE: 50
ADLS_ACUITY_SCORE: 52
ADLS_ACUITY_SCORE: 50
ADLS_ACUITY_SCORE: 52
ADLS_ACUITY_SCORE: 50
ADLS_ACUITY_SCORE: 52
ADLS_ACUITY_SCORE: 50

## 2025-08-29 ENCOUNTER — APPOINTMENT (OUTPATIENT)
Dept: GENERAL RADIOLOGY | Facility: CLINIC | Age: 65
DRG: 742 | End: 2025-08-29
Attending: OBSTETRICS & GYNECOLOGY
Payer: COMMERCIAL

## 2025-08-29 ENCOUNTER — APPOINTMENT (OUTPATIENT)
Dept: PHYSICAL THERAPY | Facility: CLINIC | Age: 65
DRG: 742 | End: 2025-08-29
Attending: OBSTETRICS & GYNECOLOGY
Payer: COMMERCIAL

## 2025-08-29 PROCEDURE — 74018 RADEX ABDOMEN 1 VIEW: CPT | Mod: 26 | Performed by: RADIOLOGY

## 2025-08-29 PROCEDURE — 74018 RADEX ABDOMEN 1 VIEW: CPT

## 2025-08-29 ASSESSMENT — ACTIVITIES OF DAILY LIVING (ADL)
ADLS_ACUITY_SCORE: 52
ADLS_ACUITY_SCORE: 54
ADLS_ACUITY_SCORE: 52
ADLS_ACUITY_SCORE: 52
ADLS_ACUITY_SCORE: 54
ADLS_ACUITY_SCORE: 52
ADLS_ACUITY_SCORE: 54
ADLS_ACUITY_SCORE: 52
ADLS_ACUITY_SCORE: 52
ADLS_ACUITY_SCORE: 54
ADLS_ACUITY_SCORE: 54
ADLS_ACUITY_SCORE: 52

## 2025-08-30 ENCOUNTER — APPOINTMENT (OUTPATIENT)
Dept: PHYSICAL THERAPY | Facility: CLINIC | Age: 65
DRG: 742 | End: 2025-08-30
Attending: OBSTETRICS & GYNECOLOGY
Payer: COMMERCIAL

## 2025-08-30 ASSESSMENT — ACTIVITIES OF DAILY LIVING (ADL)
ADLS_ACUITY_SCORE: 54

## 2025-08-31 ASSESSMENT — ACTIVITIES OF DAILY LIVING (ADL)
ADLS_ACUITY_SCORE: 51
ADLS_ACUITY_SCORE: 53
ADLS_ACUITY_SCORE: 51
ADLS_ACUITY_SCORE: 53
ADLS_ACUITY_SCORE: 51

## 2025-09-01 ENCOUNTER — HOSPITAL ENCOUNTER (INPATIENT)
Facility: CLINIC | Age: 65
LOS: 2 days | Discharge: HOME | End: 2025-09-04
Attending: EMERGENCY MEDICINE
Payer: COMMERCIAL

## 2025-09-01 ENCOUNTER — TELEPHONE (OUTPATIENT)
Dept: ONCOLOGY | Facility: CLINIC | Age: 65
End: 2025-09-01
Payer: COMMERCIAL

## 2025-09-01 DIAGNOSIS — K56.7 ILEUS, POSTOPERATIVE (H): Primary | ICD-10-CM

## 2025-09-01 DIAGNOSIS — K91.89 ILEUS, POSTOPERATIVE (H): Primary | ICD-10-CM

## 2025-09-01 PROCEDURE — 99285 EMERGENCY DEPT VISIT HI MDM: CPT | Mod: 25 | Performed by: EMERGENCY MEDICINE

## 2025-09-01 RX ORDER — ONDANSETRON 2 MG/ML
4 INJECTION INTRAMUSCULAR; INTRAVENOUS ONCE
Status: COMPLETED | OUTPATIENT
Start: 2025-09-01 | End: 2025-09-02

## 2025-09-01 RX ORDER — HYDROMORPHONE HYDROCHLORIDE 1 MG/ML
0.5 INJECTION, SOLUTION INTRAMUSCULAR; INTRAVENOUS; SUBCUTANEOUS ONCE
Refills: 0 | Status: COMPLETED | OUTPATIENT
Start: 2025-09-01 | End: 2025-09-02

## 2025-09-01 ASSESSMENT — COLUMBIA-SUICIDE SEVERITY RATING SCALE - C-SSRS
6. HAVE YOU EVER DONE ANYTHING, STARTED TO DO ANYTHING, OR PREPARED TO DO ANYTHING TO END YOUR LIFE?: NO
1. IN THE PAST MONTH, HAVE YOU WISHED YOU WERE DEAD OR WISHED YOU COULD GO TO SLEEP AND NOT WAKE UP?: NO
2. HAVE YOU ACTUALLY HAD ANY THOUGHTS OF KILLING YOURSELF IN THE PAST MONTH?: NO

## 2025-09-01 ASSESSMENT — ACTIVITIES OF DAILY LIVING (ADL): ADLS_ACUITY_SCORE: 54

## 2025-09-02 ENCOUNTER — APPOINTMENT (OUTPATIENT)
Dept: CT IMAGING | Facility: CLINIC | Age: 65
End: 2025-09-02
Attending: EMERGENCY MEDICINE
Payer: COMMERCIAL

## 2025-09-02 PROBLEM — K56.7 ILEUS, POSTOPERATIVE (H): Status: ACTIVE | Noted: 2025-09-02

## 2025-09-02 PROBLEM — K56.609 SMALL BOWEL OBSTRUCTION (H): Status: ACTIVE | Noted: 2025-09-02

## 2025-09-02 PROBLEM — K91.89 ILEUS, POSTOPERATIVE (H): Status: ACTIVE | Noted: 2025-09-02

## 2025-09-02 LAB
ALBUMIN SERPL BCG-MCNC: 3.7 G/DL (ref 3.5–5.2)
ALP SERPL-CCNC: 85 U/L (ref 40–150)
ALT SERPL W P-5'-P-CCNC: 28 U/L (ref 0–50)
ANION GAP SERPL CALCULATED.3IONS-SCNC: 12 MMOL/L (ref 7–15)
ANION GAP SERPL CALCULATED.3IONS-SCNC: 12 MMOL/L (ref 7–15)
AST SERPL W P-5'-P-CCNC: 33 U/L (ref 0–45)
BASOPHILS # BLD AUTO: <0.03 10E3/UL (ref 0–0.2)
BASOPHILS NFR BLD AUTO: 0.3 %
BILIRUB SERPL-MCNC: 0.3 MG/DL
BUN SERPL-MCNC: 18.4 MG/DL (ref 8–23)
BUN SERPL-MCNC: 18.6 MG/DL (ref 8–23)
CALCIUM SERPL-MCNC: 7.9 MG/DL (ref 8.8–10.4)
CALCIUM SERPL-MCNC: 8.2 MG/DL (ref 8.8–10.4)
CHLORIDE SERPL-SCNC: 102 MMOL/L (ref 98–107)
CHLORIDE SERPL-SCNC: 105 MMOL/L (ref 98–107)
CREAT BLD-MCNC: 0.8 MG/DL (ref 0.5–1)
CREAT SERPL-MCNC: 0.69 MG/DL (ref 0.51–0.95)
CREAT SERPL-MCNC: 0.73 MG/DL (ref 0.51–0.95)
EGFRCR SERPLBLD CKD-EPI 2021: >60 ML/MIN/1.73M2
EGFRCR SERPLBLD CKD-EPI 2021: >90 ML/MIN/1.73M2
EGFRCR SERPLBLD CKD-EPI 2021: >90 ML/MIN/1.73M2
EOSINOPHIL # BLD AUTO: 0.11 10E3/UL (ref 0–0.7)
EOSINOPHIL NFR BLD AUTO: 1.4 %
ERYTHROCYTE [DISTWIDTH] IN BLOOD BY AUTOMATED COUNT: 13.8 % (ref 10–15)
ERYTHROCYTE [DISTWIDTH] IN BLOOD BY AUTOMATED COUNT: 13.9 % (ref 10–15)
GLUCOSE SERPL-MCNC: 94 MG/DL (ref 70–99)
GLUCOSE SERPL-MCNC: 97 MG/DL (ref 70–99)
HCO3 SERPL-SCNC: 22 MMOL/L (ref 22–29)
HCO3 SERPL-SCNC: 26 MMOL/L (ref 22–29)
HCT VFR BLD AUTO: 32.5 % (ref 35–47)
HCT VFR BLD AUTO: 33.9 % (ref 35–47)
HGB BLD-MCNC: 10.1 G/DL (ref 11.7–15.7)
HGB BLD-MCNC: 10.6 G/DL (ref 11.7–15.7)
HOLD SPECIMEN: NORMAL
IMM GRANULOCYTES # BLD: <0.03 10E3/UL
IMM GRANULOCYTES NFR BLD: 0.3 %
LACTATE SERPL-SCNC: 0.6 MMOL/L (ref 0.7–2)
LACTATE SERPL-SCNC: 0.8 MMOL/L (ref 0.7–2)
LIPASE SERPL-CCNC: 34 U/L (ref 13–60)
LYMPHOCYTES # BLD AUTO: 0.79 10E3/UL (ref 0.8–5.3)
LYMPHOCYTES NFR BLD AUTO: 9.9 %
MAGNESIUM SERPL-MCNC: 2.4 MG/DL (ref 1.7–2.3)
MCH RBC QN AUTO: 27.4 PG (ref 26.5–33)
MCH RBC QN AUTO: 27.7 PG (ref 26.5–33)
MCHC RBC AUTO-ENTMCNC: 31.1 G/DL (ref 31.5–36.5)
MCHC RBC AUTO-ENTMCNC: 31.3 G/DL (ref 31.5–36.5)
MCV RBC AUTO: 88.3 FL (ref 78–100)
MCV RBC AUTO: 88.5 FL (ref 78–100)
MONOCYTES # BLD AUTO: 0.47 10E3/UL (ref 0–1.3)
MONOCYTES NFR BLD AUTO: 5.9 %
NEUTROPHILS # BLD AUTO: 6.58 10E3/UL (ref 1.6–8.3)
NEUTROPHILS NFR BLD AUTO: 82.2 %
NRBC # BLD AUTO: <0.03 10E3/UL
NRBC BLD AUTO-RTO: 0 /100
PLATELET # BLD AUTO: 269 10E3/UL (ref 150–450)
PLATELET # BLD AUTO: 284 10E3/UL (ref 150–450)
POTASSIUM SERPL-SCNC: 3.3 MMOL/L (ref 3.4–5.3)
POTASSIUM SERPL-SCNC: 3.5 MMOL/L (ref 3.4–5.3)
POTASSIUM SERPL-SCNC: 3.6 MMOL/L (ref 3.4–5.3)
PROT SERPL-MCNC: 6.3 G/DL (ref 6.4–8.3)
RBC # BLD AUTO: 3.68 10E6/UL (ref 3.8–5.2)
RBC # BLD AUTO: 3.83 10E6/UL (ref 3.8–5.2)
SODIUM SERPL-SCNC: 139 MMOL/L (ref 135–145)
SODIUM SERPL-SCNC: 140 MMOL/L (ref 135–145)
WBC # BLD AUTO: 7.37 10E3/UL (ref 4–11)
WBC # BLD AUTO: 7.99 10E3/UL (ref 4–11)

## 2025-09-02 PROCEDURE — 36415 COLL VENOUS BLD VENIPUNCTURE: CPT

## 2025-09-02 PROCEDURE — 120N000002 HC R&B MED SURG/OB UMMC

## 2025-09-02 PROCEDURE — 250N000011 HC RX IP 250 OP 636

## 2025-09-02 PROCEDURE — 85027 COMPLETE CBC AUTOMATED: CPT | Performed by: OBSTETRICS & GYNECOLOGY

## 2025-09-02 PROCEDURE — 85025 COMPLETE CBC W/AUTO DIFF WBC: CPT | Performed by: EMERGENCY MEDICINE

## 2025-09-02 PROCEDURE — 36415 COLL VENOUS BLD VENIPUNCTURE: CPT | Performed by: EMERGENCY MEDICINE

## 2025-09-02 PROCEDURE — 258N000003 HC RX IP 258 OP 636

## 2025-09-02 PROCEDURE — 250N000011 HC RX IP 250 OP 636: Performed by: EMERGENCY MEDICINE

## 2025-09-02 PROCEDURE — 83605 ASSAY OF LACTIC ACID: CPT | Performed by: OBSTETRICS & GYNECOLOGY

## 2025-09-02 PROCEDURE — 83735 ASSAY OF MAGNESIUM: CPT | Performed by: EMERGENCY MEDICINE

## 2025-09-02 PROCEDURE — 999N000248 HC STATISTIC IV INSERT WITH US BY RN

## 2025-09-02 PROCEDURE — 258N000003 HC RX IP 258 OP 636: Performed by: EMERGENCY MEDICINE

## 2025-09-02 PROCEDURE — 82565 ASSAY OF CREATININE: CPT

## 2025-09-02 PROCEDURE — 36415 COLL VENOUS BLD VENIPUNCTURE: CPT | Performed by: OBSTETRICS & GYNECOLOGY

## 2025-09-02 PROCEDURE — 83690 ASSAY OF LIPASE: CPT | Performed by: EMERGENCY MEDICINE

## 2025-09-02 PROCEDURE — 82247 BILIRUBIN TOTAL: CPT | Performed by: EMERGENCY MEDICINE

## 2025-09-02 PROCEDURE — 74177 CT ABD & PELVIS W/CONTRAST: CPT

## 2025-09-02 PROCEDURE — 250N000013 HC RX MED GY IP 250 OP 250 PS 637

## 2025-09-02 PROCEDURE — 74177 CT ABD & PELVIS W/CONTRAST: CPT | Mod: 26 | Performed by: RADIOLOGY

## 2025-09-02 PROCEDURE — 84132 ASSAY OF SERUM POTASSIUM: CPT

## 2025-09-02 PROCEDURE — 83605 ASSAY OF LACTIC ACID: CPT | Performed by: EMERGENCY MEDICINE

## 2025-09-02 PROCEDURE — 255N000002 HC RX 255 OP 636: Performed by: EMERGENCY MEDICINE

## 2025-09-02 PROCEDURE — 250N000009 HC RX 250: Performed by: EMERGENCY MEDICINE

## 2025-09-02 RX ORDER — NALOXONE HYDROCHLORIDE 0.4 MG/ML
0.2 INJECTION, SOLUTION INTRAMUSCULAR; INTRAVENOUS; SUBCUTANEOUS
Status: DISCONTINUED | OUTPATIENT
Start: 2025-09-02 | End: 2025-09-04 | Stop reason: HOSPADM

## 2025-09-02 RX ORDER — POTASSIUM CHLORIDE 7.45 MG/ML
10 INJECTION INTRAVENOUS
Status: COMPLETED | OUTPATIENT
Start: 2025-09-02 | End: 2025-09-02

## 2025-09-02 RX ORDER — OMEPRAZOLE 20 MG/1
20 CAPSULE, DELAYED RELEASE ORAL EVERY MORNING
Status: DISCONTINUED | OUTPATIENT
Start: 2025-09-02 | End: 2025-09-02

## 2025-09-02 RX ORDER — LIDOCAINE 40 MG/G
CREAM TOPICAL
Status: DISCONTINUED | OUTPATIENT
Start: 2025-09-02 | End: 2025-09-04 | Stop reason: HOSPADM

## 2025-09-02 RX ORDER — LIDOCAINE HYDROCHLORIDE 20 MG/ML
5 SOLUTION OROPHARYNGEAL ONCE
Status: COMPLETED | OUTPATIENT
Start: 2025-09-02 | End: 2025-09-02

## 2025-09-02 RX ORDER — ENOXAPARIN SODIUM 100 MG/ML
40 INJECTION SUBCUTANEOUS EVERY 12 HOURS
Status: DISCONTINUED | OUTPATIENT
Start: 2025-09-02 | End: 2025-09-04 | Stop reason: HOSPADM

## 2025-09-02 RX ORDER — ACETAMINOPHEN 325 MG/1
650 TABLET ORAL EVERY 6 HOURS PRN
Status: DISCONTINUED | OUTPATIENT
Start: 2025-09-02 | End: 2025-09-04 | Stop reason: HOSPADM

## 2025-09-02 RX ORDER — VALSARTAN 160 MG/1
160 TABLET ORAL EVERY MORNING
Status: DISCONTINUED | OUTPATIENT
Start: 2025-09-02 | End: 2025-09-03

## 2025-09-02 RX ORDER — LEVOTHYROXINE SODIUM 100 UG/1
200 TABLET ORAL
Status: DISCONTINUED | OUTPATIENT
Start: 2025-09-02 | End: 2025-09-04 | Stop reason: HOSPADM

## 2025-09-02 RX ORDER — ONDANSETRON 4 MG/1
4 TABLET, ORALLY DISINTEGRATING ORAL ONCE
Status: COMPLETED | OUTPATIENT
Start: 2025-09-02 | End: 2025-09-02

## 2025-09-02 RX ORDER — OXYCODONE HYDROCHLORIDE 5 MG/1
5-10 TABLET ORAL EVERY 4 HOURS PRN
Status: DISCONTINUED | OUTPATIENT
Start: 2025-09-02 | End: 2025-09-04 | Stop reason: HOSPADM

## 2025-09-02 RX ORDER — NALOXONE HYDROCHLORIDE 0.4 MG/ML
0.4 INJECTION, SOLUTION INTRAMUSCULAR; INTRAVENOUS; SUBCUTANEOUS
Status: DISCONTINUED | OUTPATIENT
Start: 2025-09-02 | End: 2025-09-04 | Stop reason: HOSPADM

## 2025-09-02 RX ORDER — PRAMIPEXOLE DIHYDROCHLORIDE 0.12 MG/1
0.12 TABLET ORAL 2 TIMES DAILY
Status: DISCONTINUED | OUTPATIENT
Start: 2025-09-02 | End: 2025-09-02 | Stop reason: DRUGHIGH

## 2025-09-02 RX ORDER — PROCHLORPERAZINE MALEATE 10 MG
10 TABLET ORAL EVERY 6 HOURS PRN
Status: DISCONTINUED | OUTPATIENT
Start: 2025-09-02 | End: 2025-09-04 | Stop reason: HOSPADM

## 2025-09-02 RX ORDER — ONDANSETRON 2 MG/ML
4 INJECTION INTRAMUSCULAR; INTRAVENOUS EVERY 6 HOURS PRN
Status: DISCONTINUED | OUTPATIENT
Start: 2025-09-02 | End: 2025-09-04 | Stop reason: HOSPADM

## 2025-09-02 RX ORDER — DEXTROSE MONOHYDRATE AND SODIUM CHLORIDE 5; .45 G/100ML; G/100ML
INJECTION, SOLUTION INTRAVENOUS CONTINUOUS
Status: DISCONTINUED | OUTPATIENT
Start: 2025-09-02 | End: 2025-09-03

## 2025-09-02 RX ORDER — ONDANSETRON 4 MG/1
4 TABLET, ORALLY DISINTEGRATING ORAL EVERY 6 HOURS PRN
Status: DISCONTINUED | OUTPATIENT
Start: 2025-09-02 | End: 2025-09-04 | Stop reason: HOSPADM

## 2025-09-02 RX ORDER — AMITRIPTYLINE HYDROCHLORIDE 10 MG/1
10 TABLET ORAL AT BEDTIME
Status: DISCONTINUED | OUTPATIENT
Start: 2025-09-02 | End: 2025-09-04 | Stop reason: HOSPADM

## 2025-09-02 RX ORDER — SALIVA STIMULANT COMB. NO.3
1 SPRAY, NON-AEROSOL (ML) MUCOUS MEMBRANE 4 TIMES DAILY
Status: DISCONTINUED | OUTPATIENT
Start: 2025-09-02 | End: 2025-09-04 | Stop reason: HOSPADM

## 2025-09-02 RX ADMIN — DEXTROSE AND SODIUM CHLORIDE: 5; .45 INJECTION, SOLUTION INTRAVENOUS at 05:47

## 2025-09-02 RX ADMIN — PANTOPRAZOLE SODIUM 40 MG: 40 INJECTION, POWDER, FOR SOLUTION INTRAVENOUS at 02:34

## 2025-09-02 RX ADMIN — ACETAMINOPHEN 650 MG: 325 TABLET, FILM COATED ORAL at 10:08

## 2025-09-02 RX ADMIN — DEXTROSE AND SODIUM CHLORIDE: 5; .45 INJECTION, SOLUTION INTRAVENOUS at 21:07

## 2025-09-02 RX ADMIN — ONDANSETRON 4 MG: 2 INJECTION INTRAMUSCULAR; INTRAVENOUS at 02:17

## 2025-09-02 RX ADMIN — SODIUM CHLORIDE 86 ML: 9 INJECTION, SOLUTION INTRAVENOUS at 02:09

## 2025-09-02 RX ADMIN — Medication 1 SPRAY: at 20:13

## 2025-09-02 RX ADMIN — ONDANSETRON 4 MG: 4 TABLET, ORALLY DISINTEGRATING ORAL at 00:20

## 2025-09-02 RX ADMIN — SODIUM CHLORIDE 1000 ML: 0.9 INJECTION, SOLUTION INTRAVENOUS at 02:18

## 2025-09-02 RX ADMIN — LEVOTHYROXINE SODIUM 200 MCG: 0.1 TABLET ORAL at 08:23

## 2025-09-02 RX ADMIN — POTASSIUM CHLORIDE 10 MEQ: 7.46 INJECTION, SOLUTION INTRAVENOUS at 08:29

## 2025-09-02 RX ADMIN — IOHEXOL 143 ML: 350 INJECTION, SOLUTION INTRAVENOUS at 02:09

## 2025-09-02 RX ADMIN — HYDROMORPHONE HYDROCHLORIDE 0.5 MG: 1 INJECTION, SOLUTION INTRAMUSCULAR; INTRAVENOUS; SUBCUTANEOUS at 02:17

## 2025-09-02 RX ADMIN — ENOXAPARIN SODIUM 40 MG: 40 INJECTION SUBCUTANEOUS at 21:07

## 2025-09-02 ASSESSMENT — ACTIVITIES OF DAILY LIVING (ADL)
ADLS_ACUITY_SCORE: 61
ADLS_ACUITY_SCORE: 54
ADLS_ACUITY_SCORE: 61
ADLS_ACUITY_SCORE: 58
ADLS_ACUITY_SCORE: 61
ADLS_ACUITY_SCORE: 41
ADLS_ACUITY_SCORE: 58
ADLS_ACUITY_SCORE: 56
ADLS_ACUITY_SCORE: 56
ADLS_ACUITY_SCORE: 54
ADLS_ACUITY_SCORE: 41
ADLS_ACUITY_SCORE: 41
ADLS_ACUITY_SCORE: 54
ADLS_ACUITY_SCORE: 61
ADLS_ACUITY_SCORE: 61
ADLS_ACUITY_SCORE: 54
ADLS_ACUITY_SCORE: 41
ADLS_ACUITY_SCORE: 54
ADLS_ACUITY_SCORE: 54
ADLS_ACUITY_SCORE: 56
ADLS_ACUITY_SCORE: 41
ADLS_ACUITY_SCORE: 54
ADLS_ACUITY_SCORE: 41

## 2025-09-03 LAB
ANION GAP SERPL CALCULATED.3IONS-SCNC: 12 MMOL/L (ref 7–15)
BUN SERPL-MCNC: 16.7 MG/DL (ref 8–23)
CALCIUM SERPL-MCNC: 8 MG/DL (ref 8.8–10.4)
CHLORIDE SERPL-SCNC: 102 MMOL/L (ref 98–107)
CREAT SERPL-MCNC: 0.69 MG/DL (ref 0.51–0.95)
EGFRCR SERPLBLD CKD-EPI 2021: >90 ML/MIN/1.73M2
ERYTHROCYTE [DISTWIDTH] IN BLOOD BY AUTOMATED COUNT: 13.9 % (ref 10–15)
GLUCOSE SERPL-MCNC: 107 MG/DL (ref 70–99)
HCO3 SERPL-SCNC: 25 MMOL/L (ref 22–29)
HCT VFR BLD AUTO: 34.4 % (ref 35–47)
HGB BLD-MCNC: 10.6 G/DL (ref 11.7–15.7)
MCH RBC QN AUTO: 27.5 PG (ref 26.5–33)
MCHC RBC AUTO-ENTMCNC: 30.8 G/DL (ref 31.5–36.5)
MCV RBC AUTO: 89.1 FL (ref 78–100)
PLATELET # BLD AUTO: 259 10E3/UL (ref 150–450)
POTASSIUM SERPL-SCNC: 2.9 MMOL/L (ref 3.4–5.3)
POTASSIUM SERPL-SCNC: 3.5 MMOL/L (ref 3.4–5.3)
RBC # BLD AUTO: 3.86 10E6/UL (ref 3.8–5.2)
SODIUM SERPL-SCNC: 139 MMOL/L (ref 135–145)
WBC # BLD AUTO: 6.53 10E3/UL (ref 4–11)

## 2025-09-03 PROCEDURE — 80048 BASIC METABOLIC PNL TOTAL CA: CPT

## 2025-09-03 PROCEDURE — 250N000013 HC RX MED GY IP 250 OP 250 PS 637

## 2025-09-03 PROCEDURE — 258N000003 HC RX IP 258 OP 636

## 2025-09-03 PROCEDURE — 36415 COLL VENOUS BLD VENIPUNCTURE: CPT

## 2025-09-03 PROCEDURE — 250N000011 HC RX IP 250 OP 636

## 2025-09-03 PROCEDURE — 120N000002 HC R&B MED SURG/OB UMMC

## 2025-09-03 PROCEDURE — 36415 COLL VENOUS BLD VENIPUNCTURE: CPT | Performed by: OBSTETRICS & GYNECOLOGY

## 2025-09-03 PROCEDURE — 250N000013 HC RX MED GY IP 250 OP 250 PS 637: Performed by: OBSTETRICS & GYNECOLOGY

## 2025-09-03 PROCEDURE — 84132 ASSAY OF SERUM POTASSIUM: CPT | Performed by: OBSTETRICS & GYNECOLOGY

## 2025-09-03 PROCEDURE — 85018 HEMOGLOBIN: CPT

## 2025-09-03 RX ORDER — DIPHENHYDRAMINE HYDROCHLORIDE, ZINC ACETATE 2; .1 G/100G; G/100G
CREAM TOPICAL 3 TIMES DAILY PRN
Status: DISCONTINUED | OUTPATIENT
Start: 2025-09-03 | End: 2025-09-04 | Stop reason: HOSPADM

## 2025-09-03 RX ORDER — VALSARTAN 160 MG/1
160 TABLET ORAL EVERY 24 HOURS
Status: DISCONTINUED | OUTPATIENT
Start: 2025-09-03 | End: 2025-09-04 | Stop reason: HOSPADM

## 2025-09-03 RX ORDER — POTASSIUM CHLORIDE 750 MG/1
20 TABLET, EXTENDED RELEASE ORAL ONCE
Status: COMPLETED | OUTPATIENT
Start: 2025-09-03 | End: 2025-09-03

## 2025-09-03 RX ORDER — AMITRIPTYLINE HYDROCHLORIDE 10 MG/1
10 TABLET ORAL ONCE
Status: COMPLETED | OUTPATIENT
Start: 2025-09-03 | End: 2025-09-03

## 2025-09-03 RX ORDER — POTASSIUM CHLORIDE 750 MG/1
40 TABLET, EXTENDED RELEASE ORAL ONCE
Status: COMPLETED | OUTPATIENT
Start: 2025-09-03 | End: 2025-09-03

## 2025-09-03 RX ADMIN — ENOXAPARIN SODIUM 40 MG: 40 INJECTION SUBCUTANEOUS at 08:11

## 2025-09-03 RX ADMIN — AMITRIPTYLINE HYDROCHLORIDE 10 MG: 10 TABLET, FILM COATED ORAL at 01:05

## 2025-09-03 RX ADMIN — LEVOTHYROXINE SODIUM 200 MCG: 0.1 TABLET ORAL at 08:12

## 2025-09-03 RX ADMIN — DEXTROSE AND SODIUM CHLORIDE: 5; .45 INJECTION, SOLUTION INTRAVENOUS at 07:52

## 2025-09-03 RX ADMIN — POTASSIUM CHLORIDE 20 MEQ: 750 TABLET, EXTENDED RELEASE ORAL at 10:03

## 2025-09-03 RX ADMIN — VALSARTAN 160 MG: 160 TABLET, FILM COATED ORAL at 21:22

## 2025-09-03 RX ADMIN — Medication 1 SPRAY: at 08:12

## 2025-09-03 RX ADMIN — PRAMIPEXOLE DIHYDROCHLORIDE 1.12 MG: 0.12 TABLET ORAL at 21:23

## 2025-09-03 RX ADMIN — ENOXAPARIN SODIUM 40 MG: 40 INJECTION SUBCUTANEOUS at 21:22

## 2025-09-03 RX ADMIN — POTASSIUM CHLORIDE 40 MEQ: 750 TABLET, EXTENDED RELEASE ORAL at 08:35

## 2025-09-03 RX ADMIN — PANTOPRAZOLE SODIUM 40 MG: 40 INJECTION, POWDER, FOR SOLUTION INTRAVENOUS at 08:11

## 2025-09-03 RX ADMIN — DIPHENHYDRAMINE HYDROCHLORIDE, ZINC ACETATE: 2; .1 CREAM TOPICAL at 21:45

## 2025-09-03 RX ADMIN — PRAMIPEXOLE DIHYDROCHLORIDE 1.12 MG: 0.12 TABLET ORAL at 01:06

## 2025-09-03 RX ADMIN — AMITRIPTYLINE HYDROCHLORIDE 10 MG: 10 TABLET, FILM COATED ORAL at 21:23

## 2025-09-03 ASSESSMENT — ACTIVITIES OF DAILY LIVING (ADL)
ADLS_ACUITY_SCORE: 41
ADLS_ACUITY_SCORE: 40
ADLS_ACUITY_SCORE: 41
ADLS_ACUITY_SCORE: 41
ADLS_ACUITY_SCORE: 40
ADLS_ACUITY_SCORE: 41
ADLS_ACUITY_SCORE: 40
ADLS_ACUITY_SCORE: 40
ADLS_ACUITY_SCORE: 41
ADLS_ACUITY_SCORE: 39
ADLS_ACUITY_SCORE: 39
ADLS_ACUITY_SCORE: 41

## 2025-09-04 VITALS
WEIGHT: 232.2 LBS | SYSTOLIC BLOOD PRESSURE: 143 MMHG | DIASTOLIC BLOOD PRESSURE: 83 MMHG | HEART RATE: 88 BPM | OXYGEN SATURATION: 94 % | TEMPERATURE: 98.9 F | HEIGHT: 63 IN | BODY MASS INDEX: 41.14 KG/M2 | RESPIRATION RATE: 20 BRPM

## 2025-09-04 PROCEDURE — 250N000013 HC RX MED GY IP 250 OP 250 PS 637

## 2025-09-04 RX ADMIN — LEVOTHYROXINE SODIUM 200 MCG: 0.1 TABLET ORAL at 07:54

## 2025-09-04 RX ADMIN — PRAMIPEXOLE DIHYDROCHLORIDE 1.12 MG: 0.12 TABLET ORAL at 07:56

## 2025-09-04 ASSESSMENT — ACTIVITIES OF DAILY LIVING (ADL)
ADLS_ACUITY_SCORE: 39
ADLS_ACUITY_SCORE: 43
ADLS_ACUITY_SCORE: 37
ADLS_ACUITY_SCORE: 43
ADLS_ACUITY_SCORE: 43
ADLS_ACUITY_SCORE: 39
ADLS_ACUITY_SCORE: 43
ADLS_ACUITY_SCORE: 39